# Patient Record
Sex: MALE | Race: WHITE | NOT HISPANIC OR LATINO | Employment: OTHER | URBAN - NONMETROPOLITAN AREA
[De-identification: names, ages, dates, MRNs, and addresses within clinical notes are randomized per-mention and may not be internally consistent; named-entity substitution may affect disease eponyms.]

---

## 2017-01-26 ENCOUNTER — OFFICE VISIT (OUTPATIENT)
Dept: CARDIOLOGY | Facility: CLINIC | Age: 82
End: 2017-01-26
Payer: MEDICARE

## 2017-01-26 VITALS
OXYGEN SATURATION: 96 % | HEIGHT: 69 IN | SYSTOLIC BLOOD PRESSURE: 112 MMHG | HEART RATE: 61 BPM | WEIGHT: 195 LBS | DIASTOLIC BLOOD PRESSURE: 60 MMHG | BODY MASS INDEX: 28.88 KG/M2

## 2017-01-26 DIAGNOSIS — G47.33 OSA (OBSTRUCTIVE SLEEP APNEA): ICD-10-CM

## 2017-01-26 DIAGNOSIS — Z95.5 STENTED CORONARY ARTERY: ICD-10-CM

## 2017-01-26 DIAGNOSIS — E78.2 MIXED DYSLIPIDEMIA: ICD-10-CM

## 2017-01-26 DIAGNOSIS — D64.89 ANEMIA DUE TO OTHER CAUSE: ICD-10-CM

## 2017-01-26 DIAGNOSIS — I62.9 INTRACRANIAL HEMORRHAGE (HCC): ICD-10-CM

## 2017-01-26 DIAGNOSIS — I25.10 CORONARY ARTERY DISEASE INVOLVING NATIVE CORONARY ARTERY OF NATIVE HEART WITHOUT ANGINA PECTORIS: ICD-10-CM

## 2017-01-26 DIAGNOSIS — I25.2 OLD MI (MYOCARDIAL INFARCTION): ICD-10-CM

## 2017-01-26 PROCEDURE — G8598 ASA/ANTIPLAT THER USED: HCPCS | Performed by: INTERNAL MEDICINE

## 2017-01-26 PROCEDURE — G8432 DEP SCR NOT DOC, RNG: HCPCS | Performed by: INTERNAL MEDICINE

## 2017-01-26 PROCEDURE — 1036F TOBACCO NON-USER: CPT | Performed by: INTERNAL MEDICINE

## 2017-01-26 PROCEDURE — 99214 OFFICE O/P EST MOD 30 MIN: CPT | Performed by: INTERNAL MEDICINE

## 2017-01-26 PROCEDURE — 4040F PNEUMOC VAC/ADMIN/RCVD: CPT | Performed by: INTERNAL MEDICINE

## 2017-01-26 PROCEDURE — 1101F PT FALLS ASSESS-DOCD LE1/YR: CPT | Mod: 8P | Performed by: INTERNAL MEDICINE

## 2017-01-26 PROCEDURE — G8420 CALC BMI NORM PARAMETERS: HCPCS | Performed by: INTERNAL MEDICINE

## 2017-01-26 PROCEDURE — G8482 FLU IMMUNIZE ORDER/ADMIN: HCPCS | Performed by: INTERNAL MEDICINE

## 2017-01-26 NOTE — MR AVS SNAPSHOT
"        Luis F Ruiz   2017 11:00 AM   Office Visit   MRN: [id number]    [facility]  [facility]   Dept Phone:  [phone]    Description:  Male : 1935   Provider:  Maru Sheriff MD,[facility]           Reason for Visit     Follow-Up           Allergies as of 2017     No Known Allergies      You were diagnosed with     Coronary artery disease involving native coronary artery of native heart without angina pectoris   [8451194]       Stented coronary artery   [787907]       Old MI (myocardial infarction)   [563507]       Intracranial hemorrhage (CMS-HCC)   [933337]       Mixed dyslipidemia   [596843]       ERUM (obstructive sleep apnea)   [723643]       Anemia due to other cause   [8892404]         Vital Signs     Blood Pressure Pulse Height Weight Body Mass Index Oxygen Saturation    112/60 mmHg 61 1.753 m (5' 9.02\") 88.451 kg (195 lb) 28.78 kg/m2 96%    Smoking Status                   Former Smoker           Basic Information     Date Of Birth Sex Race Ethnicity Preferred Language    1935 Male White Non- English      Your appointments     2017 11:00 AM   Annual Exam with Sasha Lopez D.O.   [facility] (--)    [address]  [facility] [address] [phone]            2017 10:00 AM   FOLLOW UP with [facility] Sheriff MD,[facility] ([facility])    [address] [address]  [address]  [facility] [address] [phone]              Problem List              ICD-10-CM Priority Class Noted - Resolved    Intracranial Hemorrhage-Right sided weakness w/ residual tingling right th,index,middle fingers I62.9   2009 - Present    ERUM (obstructive sleep apnea) G47.33   2009 - Present    Dysphagia    2009 - Present    Respiratory insufficiency R06.89   2009 - Present    Mixed dyslipidemia E78.2   7/10/2014 - Present    Acute blood loss anemia D62   3/7/2016 - Present "    Upper GI bleed K92.2   3/7/2016 - Present    Depression F32.9   3/23/2016 - Present    CVA (cerebral vascular accident) (CMS-HCC) I63.9   6/15/2016 - Present    Benign essential HTN I10   10/20/2016 - Present      Health Maintenance        Date Due Completion Dates    COLONOSCOPY 10/5/2020 10/5/2015 (Done), 11/14/2009 (Done)    Override on 10/5/2015: Done (polyps)    Override on 11/14/2009: Done    IMM DTaP/Tdap/Td Vaccine (2 - Td) 10/28/2025 10/28/2015            Current Immunizations     13-VALENT PCV PREVNAR 11/1/2014    Influenza TIV (IM) 10/14/2016, 11/1/2015, 11/14/2013, 10/17/2012, 10/1/2011    Influenza Vaccine 11/1/2009    Influenza Vaccine Adult HD 11/1/2015    Pneumococcal Vaccine (UF)Historical Data 10/1/2011    Pneumococcal polysaccharide vaccine (PPSV-23) 12/12/2009 12:53 PM    SHINGLES VACCINE 11/1/2014    Tdap Vaccine 10/28/2015    Tetanus Vaccine 11/1/2015, 4/20/2012    Varicella Vaccine Live 10/17/2012      Below and/or attached are the medications your provider expects you to take. Review all of your home medications and newly ordered medications with your provider and/or pharmacist. Follow medication instructions as directed by your provider and/or pharmacist. Please keep your medication list with you and share with your provider. Update the information when medications are discontinued, doses are changed, or new medications (including over-the-counter products) are added; and carry medication information at all times in the event of emergency situations     Allergies:  No Known Allergies          Medications  Valid as of: January 26, 2017 - 11:23 AM    Generic Name Brand Name Tablet Size Instructions for use    AmLODIPine Besylate (Tab) NORVASC 5 MG Take 1 tablet by mouth  twice a day        Atorvastatin Calcium (Tab) LIPITOR 40 MG Take 1 tablet by mouth  every day        Cholecalciferol (Tab) cholecalciferol 1000 UNIT Take 1,000 Units by mouth every day.        Clopidogrel Bisulfate (Tab)  PLAVIX 75 MG Take 1 tablet by mouth  every day        Losartan Potassium (Tab) COZAAR 25 MG Take 1 tablet by mouth  every day        Lutein (Tab) Lutein 20 MG Take  by mouth.        Metoprolol Tartrate (Tab) LOPRESSOR 25 MG Take 0.5 Tabs by mouth 2 times a day.        Nitroglycerin (SL Tab) NITROSTAT 0.4 MG Place 1 Tab under tongue as needed for Chest Pain. 1 tab every 5 minutes for CP, for no more than 3 doses. After three doses, call 911.        Pantoprazole Sodium (Tablet Delayed Response) PROTONIX 40 MG Take 1 Tab by mouth every day.        Sertraline HCl (Tab) ZOLOFT 25 MG Take 1 tablet by mouth  every day        Tadalafil (Tab) CIALIS 10 MG Take 1 Tab by mouth as needed for Erectile Dysfunction.        .                 Medicines prescribed today were sent to:     [facility] - [address] [facility] [[email or web address]] [id number]    Phone: [phone] Fax: [phone]    Open 24 Hours?: No    [id number]   [place], [place] - [id number] [facility]    [id number] [address]    Phone: [phone] Fax: [phone]    Open 24 Hours?: No      Medication refill instructions:       If your prescription bottle indicates you have medication refills left, it is not necessary to call your provider’s office. Please contact your pharmacy and they will refill your medication.    If your prescription bottle indicates you do not have any refills left, you may request refills at any time through one of the following ways: The online [employer] system (except Urgent Care), by calling your provider’s office, or by asking your pharmacy to contact your provider’s office with a refill request. Medication refills are processed only during regular business hours and may not be available until the next business day. Your provider may request additional information or to have a follow-up visit with you prior to refilling your medication.      *Please Note: Medication refills are assigned a new Rx number when refilled electronically. Your pharmacy may indicate that no refills were authorized even though a new prescription for the same medication is available at the pharmacy. Please request the medicine by name with the pharmacy before contacting your provider for a refill.        Your To Do List     Future Labs/Procedures Complete By Expires    COMP METABOLIC PANEL  As directed 1/26/2018    TSH  As directed 1/26/2018         MyChart Access Code: Activation code not generated  Current [employer] Status: Active

## 2017-01-26 NOTE — Clinical Note
[facility] [address],   [address]  [place] [address]  Phone: [phone]  Fax: [phone]              Luis F Ruiz  1935    Encounter Date: 1/26/2017    Sasha Lopez D.O.    Thank you for the referral. I had the pleasure of seeing Luis F Ruiz today in cardiology clinic. I've attached my visit note below. If you have any questions please feel free to give me a call anytime.      Maru Sheriff MD, PhD, Located within [facility]  Cardiology and Lipidology  [facility]                                                                  PROGRESS NOTE:  Chief Complaint   Patient presents with   • Follow-Up       This patient is an established male who is here today to discuss:  Recheck CAD, DAPT ; more energy and no visible bleed; No more CVA/ICH    Patient Active Problem List    Diagnosis Date Noted   • Benign essential HTN 10/20/2016   • CVA (cerebral vascular accident) (CMS-[facility]) 06/15/2016   • Depression 03/23/2016   • Acute blood loss anemia 03/07/2016   • Upper GI bleed 03/07/2016   • Mixed dyslipidemia 07/10/2014   • Respiratory insufficiency 12/14/2009   • Intracranial Hemorrhage-Right sided weakness w/ residual tingling right th,index,middle fingers 12/11/2009   • ERUM (obstructive sleep apnea) 12/11/2009   • Dysphagia 12/11/2009       Past Medical History   Diagnosis Date   • Hypertension    • Intracranial hemorrhage (CMS-[facility]) 12/11/2009   • GERD (gastroesophageal reflux disease)    • Hyperlipidemia    • ED (erectile dysfunction)    • CVA (cerebrovascular accident) (CMS-[facility]) 2010   • BPH (benign prostatic hypertrophy) 2010     stable   • Elevated PSA 2011     followed by Dr. Lu   • Basal ganglia disorder      right side weakness   • History of colonoscopy with polypectomy 10/5/15     polyps; nithin 2018   • Depression 3/23/2016     Past Surgical History   Procedure Laterality Date   • Eye surgery      cataracts   • Stent placement     • Gastroscopy-endo N/A 3/9/2016     Procedure: GASTROSCOPY-ENDO with Anesthesia ;  Surgeon: David Thompson M.D.;  Location: SURGERY [facility];  Service:        Current Outpatient Prescriptions   Medication Sig Dispense Refill   • clopidogrel (PLAVIX) 75 MG Tab Take 1 tablet by mouth  every day 90 Tab 3   • atorvastatin (LIPITOR) 40 MG Tab Take 1 tablet by mouth  every day 90 Tab 3   • amlodipine (NORVASC) 5 MG Tab Take 1 tablet by mouth  twice a day 180 Tab 3   • losartan (COZAAR) 25 MG Tab Take 1 tablet by mouth  every day 90 Tab 1   • sertraline (ZOLOFT) 25 MG tablet Take 1 tablet by mouth  every day 90 Tab 1   • vitamin D (CHOLECALCIFEROL) 1000 UNIT Tab Take 1,000 Units by mouth every day.     • metoprolol (LOPRESSOR) 25 MG Tab Take 0.5 Tabs by mouth 2 times a day. 90 Tab 3   • Lutein 20 MG Tab Take  by mouth.     • pantoprazole (PROTONIX) 40 MG Tablet Delayed Response Take 1 Tab by mouth every day. 30 Tab 11   • NITROSTAT 0.4 MG SL Tab Place 1 Tab under tongue as needed for Chest Pain. 1 tab every 5 minutes for CP, for no more than 3 doses. After three doses, call 911. 90 Tab 0   • tadalafil (CIALIS) 10 MG tablet Take 1 Tab by mouth as needed for Erectile Dysfunction. 40 Tab 0     No current facility-administered medications for this visit.     Review of patient's allergies indicates no known allergies.      Review of Systems:     Constitutional: Denies fevers, Denies weight changes  Eyes: Denies changes in vision, no eye pain  Ears/Nose/Throat/Mouth: Denies nasal congestion or sore throat   Cardiovascular: Denies chest pain or palpitations   Respiratory: Denies shortness of breath , Denies cough  Gastrointestinal/Hepatic: Denies abdominal pain, nausea, vomiting, diarrhea, constipation or GI bleeding   Genitourinary: Denies bladder dysfunction, dysuria or frequency  Musculoskeletal/Rheum: Denies  joint pain and swelling   Skin/Breast: Denies rash, denies breast lumps or  "discharge  Neurological: Denies headache, confusion, memory loss or focal weakness/parasthesias  Psychiatric: denies mood disorder   Endocrine: denies hx of diabetes or thyroid dysfunction  Heme/Oncology/Lymph Nodes: Denies enlarged lymph nodes, denies brusing or known bleeding disorder  Allergic/Immunologic: Denies hx of allergies      All other systems were reviewed and are negative (AMA/CMS criteria)      Blood pressure 112/60, pulse 61, height 1.753 m (5' 9.02\"), weight 88.451 kg (195 lb), SpO2 96 %.  General Appearance: Slow; Well developed, Well nourished, No acute distress, Non-toxic appearance.    HENT: Normocephalic, Atraumatic, Oropharynx moist mucous membranes, Dentition: Mallampati 4 OP, Nose normal.    Eyes: PERRLA, EOMI, Conjunctiva normal, No discharge.  Neck: Normal range of motion, No cervical tenderness, Supple, No stridor, no JVD . No thyromegaly. No carotid bruit.  Cardiovascular: Normal heart rate, Normal rhythm, nl S1, S2, no S3, S4; No gallops; No murmurs, No rubs, . Extremitites with intact distal pulses, no cyanosis, clubbing or edema. No heaves, thrills, HJR;  Peripheral pulses: carotid 2+, brachial 2+, radial 2+, ulnar 2+, femoral 2+, popliteal 1+, PT 2+, DP 2+;  Lungs: Respiratory effort is normal. Normal breath sounds, breath sounds clear to auscultation bilaterally, no rales, no rhonchi, no wheezing.    Abdomen: Bowel sounds normal, Soft, No tenderness, No guarding, No rebound, No masses, No hepatosplenomegaly.  Skin: Warm, Dry, No erythema, No rash, no induration or crepitus.  Neurologic: Slow with Rt drooping of corner of lip; with numbness;Alert & oriented x 3, Normal motor function, Normal sensory function, No focal deficits noted, cranial nerves II through XII are normal, normal gait.  Psychiatric: Affect normal, Judgment normal, Mood normal    Results for ELIZABETH FORREST (MRN [id number]) as of 1/26/2017 11:06   Ref. Range 4/14/2016 08:20 6/8/2016 09:32 9/6/2016 08:48 10/14/2016 08:40 " 1/25/2017 08:35   WBC Latest Ref Range: 4.8-10.8 K/uL 7.2 7.3 7.1     RBC Latest Ref Range: 4.70-6.10 M/uL 4.10 (L) 4.50 (L) 4.35 (L)     Hemoglobin Latest Ref Range: 14.0-18.0 g/dL 12.6 (L) 13.6 (L) 13.4 (L)     Hematocrit Latest Ref Range: 42.0-52.0 % 37.9 (L) 39.9 (L) 39.9 (L)     MCV Latest Ref Range: 80.0-94.0 fL 92.4 88.7 91.7     MCH Latest Ref Range: 27.0-31.0 pg 30.7 30.2 30.8     MCHC Latest Ref Range: 33.0-37.0 g/dL 33.2 34.1 33.6     RDW Latest Ref Range: 11.5-14.5 % 13.4 13.0 13.4     Platelet Count Latest Ref Range: 130-400 K/uL 243 209 217     MPV Latest Ref Range: 7.4-10.4 fL 11.1 (H) 10.5 (H) 10.7 (H)     Neutrophils Automated Latest Ref Range: 39.0-70.0 %   59.9     Abs Neutrophils Automated Latest Ref Range: 1.8-7.7 K/uL   4.3     Lymphocytes Automated Latest Ref Range: 21.0-50.0 %   25.6     Abs Lymph Automated Latest Ref Range: 1.2-4.8 K/uL   1.8     Eosinophils Automated Latest Ref Range: 0.0-5.0 %   6.2 (H)     Basophils Automated Latest Ref Range: 0.0-3.0 %   1.0     Monocytes Automated Latest Ref Range: 2.0-9.0 %   7.0     Eosinophil Count, Blood Latest Ref Range: 0.00-0.50 K/uL   0.44     Sodium Latest Ref Range: 136-145 mmol/L 144 141      Potassium Latest Ref Range: 3.5-5.1 mmol/L 3.9 4.1      Chloride Latest Ref Range:  mmol/L 107 107      Co2 Latest Ref Range: 21-32 mmol/L 27 27      Anion Gap Latest Ref Range: 10-18 mmol/L 14 11      Glucose Latest Ref Range: 74-99 mg/dL 102 (H) 100 (H)      Bun Latest Ref Range: 7-18 mg/dL 18 21 (H)      Creatinine Latest Ref Range: 0.8-1.3 mg/dL 1.0 1.0      GFR If  Latest Ref Range: >60 mL/min/1.73 m 2 >60 >60      GFR If Non  Latest Ref Range: >60 mL/min/1.73 m 2 >60 >60      Calcium Latest Ref Range: 8.5-11.0 mg/dL 8.8 8.8      AST(SGOT) Latest Ref Range: 15-37 U/L  19      ALT(SGPT) Latest Ref Range: 12-78 U/L  27      Alkaline Phosphatase Latest Ref Range:  U/L  78      Total Bilirubin Latest Ref  Range: 0.2-1.0 mg/dL  0.7      Albumin Latest Ref Range: 3.4-5.0 g/dL  3.7      Total Protein Latest Ref Range: 6.4-8.2 g/dL  7.5      A-G Ratio Unknown  1.0      Magnesium Latest Ref Range: 1.8-2.4 mg/dL    2.0    Cholesterol,Tot Latest Ref Range: 120-200 mg/dL  113 (L)   139   Triglycerides Latest Ref Range: 0-150 mg/dL  143   144   HDL Latest Ref Range: 40.0-60.0 mg/dL  42.0   53.0   Non HDL Cholesterol Latest Ref Range:    71   86   LDL Latest Ref Range: <100 mg/dL  42   57   Chol-Hdl Ratio Unknown  2.69   2.62   Vitamin B12 -True Cobalamin Latest Ref Range: 211-911 pg/mL    533    Folate -Folic Acid Latest Ref Range: >3.1 ng/mL    18.59    Prostatic Specific Antigen Tot Latest Ref Range: 0.00-4.00 ng/mL     4.39 (H)   Vitamin D 25OH Total Latest Ref Range:  ng/mL  36        Assessment and Plan.   81 y.o. male will use Plavix instead of ASA; No bleedings at this period; BB use discussed will use 12.5 mg bid; Not using Cialis; Less snoring and sleeps well; Rare stop breathing per wife;    1. Coronary artery disease involving native coronary artery of native heart without angina pectoris    - COMP METABOLIC PANEL; Future    2. Stented coronary artery  asx    3. Old MI (myocardial infarction)  asx    4. Intracranial Hemorrhage-Right sided weakness w/ residual tingling right th,index,middle fingers  No recurrence    5. Mixed dyslipidemia    - TSH; Future    6. ERUM (obstructive sleep apnea)  No txmt per patient    7. Anemia due to other cause    - CBC WITHOUT DIFFERENTIAL      1. Coronary artery disease involving native coronary artery of native heart without angina pectoris  COMP METABOLIC PANEL   2. Stented coronary artery     3. Old MI (myocardial infarction)     4. Intracranial Hemorrhage-Right sided weakness w/ residual tingling right th,index,middle fingers     5. Mixed dyslipidemia  TSH   6. ERUM (obstructive sleep apnea)     7. Anemia due to other cause  CBC WITHOUT DIFFERENTIAL             Sasha  SANDRA Lopez D.O.  [address] [facility] [address]  VIA In Basket

## 2017-01-26 NOTE — PROGRESS NOTES
Chief Complaint   Patient presents with   • Follow-Up       This patient is an established male who is here today to discuss:  Recheck CAD, DAPT ; more energy and no visible bleed; No more CVA/ICH    Patient Active Problem List    Diagnosis Date Noted   • Benign essential HTN 10/20/2016   • CVA (cerebral vascular accident) (CMS-[facility]) 06/15/2016   • Depression 03/23/2016   • Acute blood loss anemia 03/07/2016   • Upper GI bleed 03/07/2016   • Mixed dyslipidemia 07/10/2014   • Respiratory insufficiency 12/14/2009   • Intracranial Hemorrhage-Right sided weakness w/ residual tingling right th,index,middle fingers 12/11/2009   • ERUM (obstructive sleep apnea) 12/11/2009   • Dysphagia 12/11/2009       Past Medical History   Diagnosis Date   • Hypertension    • Intracranial hemorrhage (CMS-HCC) 12/11/2009   • GERD (gastroesophageal reflux disease)    • Hyperlipidemia    • ED (erectile dysfunction)    • CVA (cerebrovascular accident) (CMS-HCC) 2010   • BPH (benign prostatic hypertrophy) 2010     stable   • Elevated PSA 2011     followed by Dr. Lu   • Basal ganglia disorder      right side weakness   • History of colonoscopy with polypectomy 10/5/15     polyps; nithin 2018   • Depression 3/23/2016     Past Surgical History   Procedure Laterality Date   • Eye surgery       cataracts   • Stent placement     • Gastroscopy-endo N/A 3/9/2016     Procedure: GASTROSCOPY-ENDO with Anesthesia ;  Surgeon: David Thompson M.D.;  Location: SURGERY [facility];  Service:        Current Outpatient Prescriptions   Medication Sig Dispense Refill   • clopidogrel (PLAVIX) 75 MG Tab Take 1 tablet by mouth  every day 90 Tab 3   • atorvastatin (LIPITOR) 40 MG Tab Take 1 tablet by mouth  every day 90 Tab 3   • amlodipine (NORVASC) 5 MG Tab Take 1 tablet by mouth  twice a day 180 Tab 3   • losartan (COZAAR) 25 MG Tab Take 1 tablet by mouth  every day 90 Tab 1   • sertraline (ZOLOFT) 25 MG tablet Take 1 tablet by mouth  every day 90 Tab 1   •  "vitamin D (CHOLECALCIFEROL) 1000 UNIT Tab Take 1,000 Units by mouth every day.     • metoprolol (LOPRESSOR) 25 MG Tab Take 0.5 Tabs by mouth 2 times a day. 90 Tab 3   • Lutein 20 MG Tab Take  by mouth.     • pantoprazole (PROTONIX) 40 MG Tablet Delayed Response Take 1 Tab by mouth every day. 30 Tab 11   • NITROSTAT 0.4 MG SL Tab Place 1 Tab under tongue as needed for Chest Pain. 1 tab every 5 minutes for CP, for no more than 3 doses. After three doses, call 911. 90 Tab 0   • tadalafil (CIALIS) 10 MG tablet Take 1 Tab by mouth as needed for Erectile Dysfunction. 40 Tab 0     No current facility-administered medications for this visit.     Review of patient's allergies indicates no known allergies.      Review of Systems:     Constitutional: Denies fevers, Denies weight changes  Eyes: Denies changes in vision, no eye pain  Ears/Nose/Throat/Mouth: Denies nasal congestion or sore throat   Cardiovascular: Denies chest pain or palpitations   Respiratory: Denies shortness of breath , Denies cough  Gastrointestinal/Hepatic: Denies abdominal pain, nausea, vomiting, diarrhea, constipation or GI bleeding   Genitourinary: Denies bladder dysfunction, dysuria or frequency  Musculoskeletal/Rheum: Denies  joint pain and swelling   Skin/Breast: Denies rash, denies breast lumps or discharge  Neurological: Denies headache, confusion, memory loss or focal weakness/parasthesias  Psychiatric: denies mood disorder   Endocrine: denies hx of diabetes or thyroid dysfunction  Heme/Oncology/Lymph Nodes: Denies enlarged lymph nodes, denies brusing or known bleeding disorder  Allergic/Immunologic: Denies hx of allergies      All other systems were reviewed and are negative (AMA/CMS criteria)      Blood pressure 112/60, pulse 61, height 1.753 m (5' 9.02\"), weight 88.451 kg (195 lb), SpO2 96 %.  General Appearance: Slow; Well developed, Well nourished, No acute distress, Non-toxic appearance.    HENT: Normocephalic, Atraumatic, Oropharynx moist " mucous membranes, Dentition: Mallampati 4 OP, Nose normal.    Eyes: PERRLA, EOMI, Conjunctiva normal, No discharge.  Neck: Normal range of motion, No cervical tenderness, Supple, No stridor, no JVD . No thyromegaly. No carotid bruit.  Cardiovascular: Normal heart rate, Normal rhythm, nl S1, S2, no S3, S4; No gallops; No murmurs, No rubs, . Extremitites with intact distal pulses, no cyanosis, clubbing or edema. No heaves, thrills, HJR;  Peripheral pulses: carotid 2+, brachial 2+, radial 2+, ulnar 2+, femoral 2+, popliteal 1+, PT 2+, DP 2+;  Lungs: Respiratory effort is normal. Normal breath sounds, breath sounds clear to auscultation bilaterally, no rales, no rhonchi, no wheezing.    Abdomen: Bowel sounds normal, Soft, No tenderness, No guarding, No rebound, No masses, No hepatosplenomegaly.  Skin: Warm, Dry, No erythema, No rash, no induration or crepitus.  Neurologic: Slow with Rt drooping of corner of lip; with numbness;Alert & oriented x 3, Normal motor function, Normal sensory function, No focal deficits noted, cranial nerves II through XII are normal, normal gait.  Psychiatric: Affect normal, Judgment normal, Mood normal    Results for ELIZABETH FORREST (MRN [id number]) as of 1/26/2017 11:06   Ref. Range 4/14/2016 08:20 6/8/2016 09:32 9/6/2016 08:48 10/14/2016 08:40 1/25/2017 08:35   WBC Latest Ref Range: 4.8-10.8 K/uL 7.2 7.3 7.1     RBC Latest Ref Range: 4.70-6.10 M/uL 4.10 (L) 4.50 (L) 4.35 (L)     Hemoglobin Latest Ref Range: 14.0-18.0 g/dL 12.6 (L) 13.6 (L) 13.4 (L)     Hematocrit Latest Ref Range: 42.0-52.0 % 37.9 (L) 39.9 (L) 39.9 (L)     MCV Latest Ref Range: 80.0-94.0 fL 92.4 88.7 91.7     MCH Latest Ref Range: 27.0-31.0 pg 30.7 30.2 30.8     MCHC Latest Ref Range: 33.0-37.0 g/dL 33.2 34.1 33.6     RDW Latest Ref Range: 11.5-14.5 % 13.4 13.0 13.4     Platelet Count Latest Ref Range: 130-400 K/uL 243 209 217     MPV Latest Ref Range: 7.4-10.4 fL 11.1 (H) 10.5 (H) 10.7 (H)     Neutrophils Automated Latest Ref  Range: 39.0-70.0 %   59.9     Abs Neutrophils Automated Latest Ref Range: 1.8-7.7 K/uL   4.3     Lymphocytes Automated Latest Ref Range: 21.0-50.0 %   25.6     Abs Lymph Automated Latest Ref Range: 1.2-4.8 K/uL   1.8     Eosinophils Automated Latest Ref Range: 0.0-5.0 %   6.2 (H)     Basophils Automated Latest Ref Range: 0.0-3.0 %   1.0     Monocytes Automated Latest Ref Range: 2.0-9.0 %   7.0     Eosinophil Count, Blood Latest Ref Range: 0.00-0.50 K/uL   0.44     Sodium Latest Ref Range: 136-145 mmol/L 144 141      Potassium Latest Ref Range: 3.5-5.1 mmol/L 3.9 4.1      Chloride Latest Ref Range:  mmol/L 107 107      Co2 Latest Ref Range: 21-32 mmol/L 27 27      Anion Gap Latest Ref Range: 10-18 mmol/L 14 11      Glucose Latest Ref Range: 74-99 mg/dL 102 (H) 100 (H)      Bun Latest Ref Range: 7-18 mg/dL 18 21 (H)      Creatinine Latest Ref Range: 0.8-1.3 mg/dL 1.0 1.0      GFR If  Latest Ref Range: >60 mL/min/1.73 m 2 >60 >60      GFR If Non  Latest Ref Range: >60 mL/min/1.73 m 2 >60 >60      Calcium Latest Ref Range: 8.5-11.0 mg/dL 8.8 8.8      AST(SGOT) Latest Ref Range: 15-37 U/L  19      ALT(SGPT) Latest Ref Range: 12-78 U/L  27      Alkaline Phosphatase Latest Ref Range:  U/L  78      Total Bilirubin Latest Ref Range: 0.2-1.0 mg/dL  0.7      Albumin Latest Ref Range: 3.4-5.0 g/dL  3.7      Total Protein Latest Ref Range: 6.4-8.2 g/dL  7.5      A-G Ratio Unknown  1.0      Magnesium Latest Ref Range: 1.8-2.4 mg/dL    2.0    Cholesterol,Tot Latest Ref Range: 120-200 mg/dL  113 (L)   139   Triglycerides Latest Ref Range: 0-150 mg/dL  143   144   HDL Latest Ref Range: 40.0-60.0 mg/dL  42.0   53.0   Non HDL Cholesterol Latest Ref Range:    71   86   LDL Latest Ref Range: <100 mg/dL  42   57   Chol-Hdl Ratio Unknown  2.69   2.62   Vitamin B12 -True Cobalamin Latest Ref Range: 211-911 pg/mL    533    Folate -Folic Acid Latest Ref Range: >3.1 ng/mL    18.59    Prostatic  Specific Antigen Tot Latest Ref Range: 0.00-4.00 ng/mL     4.39 (H)   Vitamin D 25OH Total Latest Ref Range:  ng/mL  36        Assessment and Plan.   81 y.o. male will use Plavix instead of ASA; No bleedings at this period; BB use discussed will use 12.5 mg bid; Not using Cialis; Less snoring and sleeps well; Rare stop breathing per wife;    1. Coronary artery disease involving native coronary artery of native heart without angina pectoris    - COMP METABOLIC PANEL; Future    2. Stented coronary artery  asx    3. Old MI (myocardial infarction)  asx    4. Intracranial Hemorrhage-Right sided weakness w/ residual tingling right th,index,middle fingers  No recurrence    5. Mixed dyslipidemia    - TSH; Future    6. ERUM (obstructive sleep apnea)  No txmt per patient    7. Anemia due to other cause    - CBC WITHOUT DIFFERENTIAL      1. Coronary artery disease involving native coronary artery of native heart without angina pectoris  COMP METABOLIC PANEL   2. Stented coronary artery     3. Old MI (myocardial infarction)     4. Intracranial Hemorrhage-Right sided weakness w/ residual tingling right th,index,middle fingers     5. Mixed dyslipidemia  TSH   6. ERUM (obstructive sleep apnea)     7. Anemia due to other cause  CBC WITHOUT DIFFERENTIAL

## 2017-07-13 ENCOUNTER — OFFICE VISIT (OUTPATIENT)
Dept: CARDIOLOGY | Facility: CLINIC | Age: 82
End: 2017-07-13
Payer: MEDICARE

## 2017-07-13 VITALS
HEART RATE: 54 BPM | WEIGHT: 193 LBS | BODY MASS INDEX: 28.58 KG/M2 | HEIGHT: 69 IN | OXYGEN SATURATION: 95 % | DIASTOLIC BLOOD PRESSURE: 60 MMHG | SYSTOLIC BLOOD PRESSURE: 110 MMHG

## 2017-07-13 DIAGNOSIS — I62.9 INTRACRANIAL HEMORRHAGE (HCC): ICD-10-CM

## 2017-07-13 DIAGNOSIS — I25.10 CORONARY ARTERY DISEASE INVOLVING NATIVE CORONARY ARTERY OF NATIVE HEART WITHOUT ANGINA PECTORIS: ICD-10-CM

## 2017-07-13 DIAGNOSIS — E78.2 MIXED DYSLIPIDEMIA: ICD-10-CM

## 2017-07-13 DIAGNOSIS — I10 ESSENTIAL HYPERTENSION: ICD-10-CM

## 2017-07-13 DIAGNOSIS — Z95.5 STENTED CORONARY ARTERY: ICD-10-CM

## 2017-07-13 DIAGNOSIS — I25.2 OLD MI (MYOCARDIAL INFARCTION): ICD-10-CM

## 2017-07-13 DIAGNOSIS — G47.33 OSA (OBSTRUCTIVE SLEEP APNEA): ICD-10-CM

## 2017-07-13 PROCEDURE — 99214 OFFICE O/P EST MOD 30 MIN: CPT | Performed by: INTERNAL MEDICINE

## 2017-07-13 ASSESSMENT — PAIN SCALES - GENERAL: PAINLEVEL: NO PAIN

## 2017-07-13 NOTE — Clinical Note
[facility] [address],   [address]  [place] [address]  Phone: [phone]  Fax: [phone]              Luis F Ruiz  1935    Encounter Date: 7/13/2017    Sasha Lopez D.O.    Thank you for the referral. I had the pleasure of seeing Luis F Ruiz today in cardiology clinic. I've attached my visit note below. If you have any questions please feel free to give me a call anytime.      Maru Sheriff MD, PhD, [facility]  Cardiology and Lipidology  [facility]                                                                  PROGRESS NOTE:  Chief Complaint   Patient presents with   • Follow-Up     6 month f/u     CAD, ICH/CVA    This patient is an established male who is here today to discuss:  Recheck CAD, DAPT ; more energy and no visible bleed; No more CVA/ICH; Doing well and restoring classic autos        Patient Active Problem List    Diagnosis Date Noted   • Benign essential HTN 10/20/2016   • CVA (cerebral vascular accident) (CMS-[facility]) 06/15/2016   • Depression 03/23/2016   • Acute blood loss anemia 03/07/2016   • Upper GI bleed 03/07/2016   • Mixed dyslipidemia 07/10/2014   • Respiratory insufficiency 12/14/2009   • Intracranial Hemorrhage-Right sided weakness w/ residual tingling right th,index,middle fingers 12/11/2009   • ERUM (obstructive sleep apnea) 12/11/2009   • Dysphagia 12/11/2009       Past Medical History   Diagnosis Date   • Hypertension    • Intracranial hemorrhage (CMS-[facility]) 12/11/2009   • GERD (gastroesophageal reflux disease)    • Hyperlipidemia    • ED (erectile dysfunction)    • CVA (cerebrovascular accident) (CMS-[facility]) 2010   • BPH (benign prostatic hypertrophy) 2010     stable   • Elevated PSA 2011     followed by Dr. Lu   • Basal ganglia disorder      right side weakness   • History of colonoscopy with polypectomy 10/5/15     polyps; nithin 2018   • Depression 3/23/2016          Past Surgical History   Procedure Laterality Date   • Eye surgery       cataracts   • Stent placement N/A 12/28/17     [facility] in [place], [place]   • Gastroscopy-endo N/A 3/9/2016     Procedure: GASTROSCOPY-ENDO with Anesthesia ;  Surgeon: David Thompson M.D.;  Location: SURGERY [facility];  Service:      Social History     Social History   • Marital Status:      Spouse Name: N/A   • Number of Children: N/A   • Years of Education: N/A     Social History Main Topics   • Smoking status: Former Smoker     Quit date: 03/09/1961   • Smokeless tobacco: Never Used   • Alcohol Use: No   • Drug Use: No   • Sexual Activity:     Partners: Female      Comment: ; one son (61 yo in 8/'17) lives in [place], [place]     Other Topics Concern   • None     Social History Narrative     Family History   Problem Relation Age of Onset   • Heart Disease Father    • Heart Disease Sister    • Cancer Sister      stomach   • Cancer Brother      kidney   • Heart Disease Brother        Current Outpatient Prescriptions   Medication Sig Dispense Refill   • sertraline (ZOLOFT) 25 MG tablet Take 1 tablet by mouth  every day 90 Tab 3   • metoprolol (LOPRESSOR) 25 MG Tab Take 0.5 Tabs by mouth 2 times a day. 90 Tab 3   • losartan (COZAAR) 25 MG Tab Take 1 tablet by mouth  every day 90 Tab 3   • clopidogrel (PLAVIX) 75 MG Tab Take 1 tablet by mouth  every day 90 Tab 3   • atorvastatin (LIPITOR) 40 MG Tab Take 1 tablet by mouth  every day 90 Tab 3   • amlodipine (NORVASC) 5 MG Tab Take 1 tablet by mouth  twice a day 180 Tab 3   • Lutein 20 MG Tab Take  by mouth.     • NITROSTAT 0.4 MG SL Tab Place 1 Tab under tongue as needed for Chest Pain. 1 tab every 5 minutes for CP, for no more than 3 doses. After three doses, call 911. 45 Tab 0   • tadalafil (CIALIS) 10 MG tablet Take 1 Tab by mouth as needed for Erectile Dysfunction. 40 Tab 0   • vitamin D (CHOLECALCIFEROL) 1000 UNIT Tab Take 1,000 Units by mouth every  "day.     • pantoprazole (PROTONIX) 40 MG Tablet Delayed Response Take 1 Tab by mouth every day. 30 Tab 11     No current facility-administered medications for this visit.     Review of patient's allergies indicates no known allergies.    Review of Systems:     Constitutional: Denies fevers, Denies weight changes  Eyes: Denies changes in vision, no eye pain  Ears/Nose/Throat/Mouth: Denies nasal congestion or sore throat   Cardiovascular: Denies chest pain or palpitations   Respiratory: Denies shortness of breath , Denies cough  Gastrointestinal/Hepatic: Denies abdominal pain, nausea, vomiting, diarrhea, constipation or GI bleeding   Genitourinary: Denies bladder dysfunction, dysuria or frequency  Musculoskeletal/Rheum: Denies  joint pain and swelling   Skin/Breast: Denies rash, denies breast lumps or discharge  Neurological: Denies headache, confusion, memory loss or focal weakness/parasthesias  Psychiatric: denies mood disorder   Endocrine: denies hx of diabetes or thyroid dysfunction  Heme/Oncology/Lymph Nodes: Denies enlarged lymph nodes, denies brusing or known bleeding disorder  Allergic/Immunologic: Denies hx of allergies      All other systems were reviewed and are negative (AMA/CMS criteria)      Blood pressure 110/60, pulse 54, height 1.753 m (5' 9.02\"), weight 87.544 kg (193 lb), SpO2 95 %.  General Appearance: Slow; Well developed, Well nourished, No acute distress, Non-toxic appearance.    HENT: Normocephalic, Atraumatic, Oropharynx moist mucous membranes, Dentition: Mallampati 4 OP, Nose normal.    Eyes: PERRLA, EOMI, Conjunctiva normal, No discharge.  Neck: Normal range of motion, No cervical tenderness, Supple, No stridor, no JVD . No thyromegaly. No carotid bruit.  Cardiovascular: Normal heart rate, Normal rhythm, nl S1, S2, no S3, S4; No gallops; No murmurs, No rubs, . Extremitites with intact distal pulses, no cyanosis, clubbing or edema. No heaves, thrills, HJR;  Peripheral pulses: carotid 2+, " brachial 2+, radial 2+, ulnar 2+, femoral 2+, popliteal 1+, PT 2+, DP 2+;  Lungs: Respiratory effort is normal. Normal breath sounds, breath sounds clear to auscultation bilaterally, no rales, no rhonchi, no wheezing.    Abdomen: Bowel sounds normal, Soft, No tenderness, No guarding, No rebound, No masses, No hepatosplenomegaly.  Skin: Warm, Dry, No erythema, No rash, no induration or crepitus.  Neurologic: Slow with Rt drooping of corner of lip; with numbness;Alert & oriented x 3, Normal motor function, Normal sensory function, No focal deficits noted, cranial nerves II through XII are normal, normal gait.  Psychiatric: Affect normal, Judgment normal, Mood normal    Results for ELIZABETH FORREST (MRN [id number]) as of 7/13/2017 10:00   Ref. Range 9/6/2016 08:48 10/14/2016 08:40 1/25/2017 08:35 4/11/2017 08:27 7/6/2017 08:00   WBC Latest Ref Range: 4.8-10.8 K/uL 7.1   7.9 6.1   RBC Latest Ref Range: 4.70-6.10 M/uL 4.35 (L)   4.52 (L) 4.31 (L)   Hemoglobin Latest Ref Range: 14.0-18.0 g/dL 13.4 (L)   14.3 13.5 (L)   Hematocrit Latest Ref Range: 42.0-52.0 % 39.9 (L)   41.1 (L) 38.9 (L)   MCV Latest Ref Range: 80.0-94.0 fL 91.7   90.9 90.3   MCH Latest Ref Range: 27.0-31.0 pg 30.8   31.6 (H) 31.3 (H)   MCHC Latest Ref Range: 33.0-37.0 g/dL 33.6   34.8 34.7   RDW Latest Ref Range: 11.5-14.5 % 13.4   13.0 13.0   Platelet Count Latest Ref Range: 130-400 K/uL 217   236 206   MPV Latest Ref Range: 7.4-10.4 fL 10.7 (H)   10.5 (H) 10.9 (H)   Neutrophils Automated Latest Ref Range: 39.0-70.0 % 59.9       Abs Neutrophils Automated Latest Ref Range: 1.8-7.7 K/uL 4.3       Lymphocytes Automated Latest Ref Range: 21.0-50.0 % 25.6       Abs Lymph Automated Latest Ref Range: 1.2-4.8 K/uL 1.8       Eosinophils Automated Latest Ref Range: 0.0-5.0 % 6.2 (H)       Basophils Automated Latest Ref Range: 0.0-3.0 % 1.0       Monocytes Automated Latest Ref Range: 2.0-9.0 % 7.0       Eosinophil Count, Blood Latest Ref Range: 0.00-0.50 K/uL 0.44        Sodium Latest Ref Range: 136-145 mmol/L    144 143   Potassium Latest Ref Range: 3.5-5.1 mmol/L    4.8 3.9   Chloride Latest Ref Range:  mmol/L    107 108 (H)   Co2 Latest Ref Range: 21-32 mmol/L    28 25   Anion Gap Latest Ref Range: 10-18 mmol/L    14 14   Glucose Latest Ref Range: 74-99 mg/dL    101 (H) 95   Bun Latest Ref Range: 7-18 mg/dL    27 (H) 25 (H)   Creatinine Latest Ref Range: 0.8-1.3 mg/dL    1.0 0.9   GFR If  Latest Ref Range: >60 mL/min/1.73 m 2    >60 >60   GFR If Non  Latest Ref Range: >60 mL/min/1.73 m 2    >60 >60   Calcium Latest Ref Range: 8.5-11.0 mg/dL    9.3 8.7   AST(SGOT) Latest Ref Range: 15-37 U/L    23 15   ALT(SGPT) Latest Ref Range: 12-78 U/L    33 23   Alkaline Phosphatase Latest Ref Range:  U/L    79 66   Total Bilirubin Latest Ref Range: 0.2-1.0 mg/dL    0.9 0.5   Albumin Latest Ref Range: 3.4-5.0 g/dL    3.7 3.6   Total Protein Latest Ref Range: 6.4-8.2 g/dL    7.4 7.1   A-G Ratio Unknown    1.0 1.0   Magnesium Latest Ref Range: 1.8-2.4 mg/dL  2.0      Lipase Latest Ref Range:  U/L    156    Cholesterol,Tot Latest Ref Range: 120-200 mg/dL   139 125    Triglycerides Latest Ref Range: 0-150 mg/dL   144 136    HDL Latest Ref Range: 40.0-60.0 mg/dL   53.0 40.0    Non HDL Cholesterol Latest Ref Range:     86 85    LDL Latest Ref Range: <100 mg/dL   57 58    Chol-Hdl Ratio Unknown   2.62 3.13    Vitamin B12 -True Cobalamin Latest Ref Range: 211-911 pg/mL  533      Folate -Folic Acid Latest Ref Range: >3.1 ng/mL  18.59      Prostatic Specific Antigen Tot Latest Ref Range: 0.00-4.00 ng/mL   4.39 (H)     Vitamin D 25OH Total Latest Ref Range:  ng/mL    35 32   TSH Latest Ref Range: 0.36-3.74 uIU/mL    1.51 1.71     Assessment and Plan.   82 y.o. male has mild anemia; will recheck in the future; No active bleed;    1. Coronary artery disease involving native coronary artery of native heart without angina pectoris  asx    2.  Stented coronary artery  asx    3. Old MI (myocardial infarction)  reviewed    4. Intracranial Hemorrhage-Right sided weakness w/ residual tingling right th,index,middle fingers  None, asx    5. Mixed dyslipidemia  recheck    6. ERUM (obstructive sleep apnea)  Continue CPAP    7. Essential hypertension  controlled      Return to clinic in  6 , months    1. Coronary artery disease involving native coronary artery of native heart without angina pectoris     2. Stented coronary artery     3. Old MI (myocardial infarction)     4. Intracranial Hemorrhage-Right sided weakness w/ residual tingling right th,index,middle fingers     5. Mixed dyslipidemia     6. ERUM (obstructive sleep apnea)     7. Essential hypertension               Sasha Lopez D.O.  [address] [facility] [address]  VIA In Basket

## 2017-07-13 NOTE — MR AVS SNAPSHOT
"        Luis F Joseph   2017 10:00 AM   Office Visit   MRN: [id number]    Department:  [facility]   Dept Phone:  [phone]    Description:  Male : 1935   Provider:  Maru Sheriff MD,FAC           Reason for Visit     Follow-Up 6 month f/u      Allergies as of 2017     No Known Allergies      You were diagnosed with     Coronary artery disease involving native coronary artery of native heart without angina pectoris   [3745103]       Stented coronary artery   [117628]       Old MI (myocardial infarction)   [061387]       Intracranial hemorrhage (CMS-HCC)   [745365]       Mixed dyslipidemia   [874490]       ERUM (obstructive sleep apnea)   [060473]       Essential hypertension   [3834025]         Vital Signs     Blood Pressure Pulse Height Weight Body Mass Index Oxygen Saturation    110/60 mmHg 54 1.753 m (5' 9.02\") 87.544 kg (193 lb) 28.49 kg/m2 95%    Smoking Status                   Former Smoker           Basic Information     Date Of Birth Sex Race Ethnicity Preferred Language    1935 Male White Non- English      Your appointments     2018 11:20 AM   [facility] ([facility])    [address] [place] [address]  2nd Floor  [facility] [id number] [phone]              Problem List              ICD-10-CM Priority Class Noted - Resolved    Intracranial Hemorrhage-Right sided weakness w/ residual tingling right th,index,middle fingers I62.9   2009 - Present    ERUM (obstructive sleep apnea) G47.33   2009 - Present    Dysphagia    2009 - Present    Respiratory insufficiency R06.89   2009 - Present    Mixed dyslipidemia E78.2   7/10/2014 - Present    Acute blood loss anemia D62   3/7/2016 - Present    Upper GI bleed K92.2   3/7/2016 - Present    Depression F32.9   3/23/2016 - Present    CVA (cerebral vascular accident) (CMS-HCC) I63.9   6/15/2016 - Present    Benign " essential HTN I10   10/20/2016 - Present      Health Maintenance        Date Due Completion Dates    IMM INFLUENZA (1) 9/1/2017 10/14/2016, 11/1/2015, 11/1/2015, 11/14/2013, 10/17/2012, 10/1/2011    COLONOSCOPY 10/5/2020 10/5/2015 (Done), 11/14/2009 (Done)    Override on 10/5/2015: Done (polyps)    Override on 11/14/2009: Done    IMM DTaP/Tdap/Td Vaccine (2 - Td) 10/28/2025 10/28/2015            Current Immunizations     13-VALENT PCV PREVNAR 11/1/2014    Influenza TIV (IM) 10/14/2016, 11/1/2015, 11/14/2013, 10/17/2012, 10/1/2011    Influenza Vaccine 11/1/2009    Influenza Vaccine Adult HD 11/1/2015    Pneumococcal Vaccine (UF)Historical Data 10/1/2011    Pneumococcal polysaccharide vaccine (PPSV-23) 12/12/2009 12:53 PM    SHINGLES VACCINE 11/1/2014    Tdap Vaccine 10/28/2015    Tetanus Vaccine 11/1/2015, 4/20/2012    Varicella Vaccine Live 10/17/2012      Below and/or attached are the medications your provider expects you to take. Review all of your home medications and newly ordered medications with your provider and/or pharmacist. Follow medication instructions as directed by your provider and/or pharmacist. Please keep your medication list with you and share with your provider. Update the information when medications are discontinued, doses are changed, or new medications (including over-the-counter products) are added; and carry medication information at all times in the event of emergency situations     Allergies:  No Known Allergies          Medications  Valid as of: July 13, 2017 - 10:10 AM    Generic Name Brand Name Tablet Size Instructions for use    AmLODIPine Besylate (Tab) NORVASC 5 MG Take 1 tablet by mouth  twice a day        Atorvastatin Calcium (Tab) LIPITOR 40 MG Take 1 tablet by mouth  every day        Cholecalciferol (Tab) cholecalciferol 1000 UNIT Take 1,000 Units by mouth every day.        Clopidogrel Bisulfate (Tab) PLAVIX 75 MG Take 1 tablet by mouth  every day        Losartan Potassium (Tab)  COZAAR 25 MG Take 1 tablet by mouth  every day        Lutein (Tab) Lutein 20 MG Take  by mouth.        Metoprolol Tartrate (Tab) LOPRESSOR 25 MG Take 0.5 Tabs by mouth 2 times a day.        Nitroglycerin (SL Tab) NITROSTAT 0.4 MG Place 1 Tab under tongue as needed for Chest Pain. 1 tab every 5 minutes for CP, for no more than 3 doses. After three doses, call 911.        Pantoprazole Sodium (Tablet Delayed Response) PROTONIX 40 MG Take 1 Tab by mouth every day.        Sertraline HCl (Tab) ZOLOFT 25 MG Take 1 tablet by mouth  every day        Tadalafil (Tab) CIALIS 10 MG Take 1 Tab by mouth as needed for Erectile Dysfunction.        .                 Medicines prescribed today were sent to:     [facility] - [address] [facility] [address]    Phone: [phone] Fax: [phone]    Open 24 Hours?: No    [id number]    - [place], [place] - [id number] [place] [address]    [id number] [address]    Phone: [phone] Fax: [phone]    Open 24 Hours?: No      Medication refill instructions:       If your prescription bottle indicates you have medication refills left, it is not necessary to call your provider’s office. Please contact your pharmacy and they will refill your medication.    If your prescription bottle indicates you do not have any refills left, you may request refills at any time through one of the following ways: The online [employer] system (except Urgent Care), by calling your provider’s office, or by asking your pharmacy to contact your provider’s office with a refill request. Medication refills are processed only during regular business hours and may not be available until the next business day. Your provider may request additional information or to have a follow-up visit with you prior to refilling your medication.   *Please Note: Medication refills are assigned a new Rx number when refilled  electronically. Your pharmacy may indicate that no refills were authorized even though a new prescription for the same medication is available at the pharmacy. Please request the medicine by name with the pharmacy before contacting your provider for a refill.           [employer] Access Code: Activation code not generated  Current [employer] Status: Active

## 2017-07-13 NOTE — PROGRESS NOTES
Chief Complaint   Patient presents with   • Follow-Up     6 month f/u     CAD, ICH/CVA    This patient is an established male who is here today to discuss:  Recheck CAD, DAPT ; more energy and no visible bleed; No more CVA/ICH; Doing well and restoring classic autos        Patient Active Problem List    Diagnosis Date Noted   • Benign essential HTN 10/20/2016   • CVA (cerebral vascular accident) (CMS-[facility]) 06/15/2016   • Depression 03/23/2016   • Acute blood loss anemia 03/07/2016   • Upper GI bleed 03/07/2016   • Mixed dyslipidemia 07/10/2014   • Respiratory insufficiency 12/14/2009   • Intracranial Hemorrhage-Right sided weakness w/ residual tingling right th,index,middle fingers 12/11/2009   • ERUM (obstructive sleep apnea) 12/11/2009   • Dysphagia 12/11/2009       Past Medical History   Diagnosis Date   • Hypertension    • Intracranial hemorrhage (CMS-[facility]) 12/11/2009   • GERD (gastroesophageal reflux disease)    • Hyperlipidemia    • ED (erectile dysfunction)    • CVA (cerebrovascular accident) (CMS-HCC) 2010   • BPH (benign prostatic hypertrophy) 2010     stable   • Elevated PSA 2011     followed by Dr. Lu   • Basal ganglia disorder      right side weakness   • History of colonoscopy with polypectomy 10/5/15     polyps; nithin 2018   • Depression 3/23/2016     Past Surgical History   Procedure Laterality Date   • Eye surgery       cataracts   • Stent placement N/A 12/28/17     [facility] in [place], [place]   • Gastroscopy-endo N/A 3/9/2016     Procedure: GASTROSCOPY-ENDO with Anesthesia ;  Surgeon: David Thompson M.D.;  Location: SURGERY [facility];  Service:      Social History     Social History   • Marital Status:      Spouse Name: N/A   • Number of Children: N/A   • Years of Education: N/A     Social History Main Topics   • Smoking status: Former Smoker     Quit date: 03/09/1961   • Smokeless tobacco: Never Used   • Alcohol Use: No   • Drug Use: No   • Sexual Activity:      Partners: Female      Comment: ; one son (61 yo in 8/'17) lives in [place], [place]     Other Topics Concern   • None     Social History Narrative     Family History   Problem Relation Age of Onset   • Heart Disease Father    • Heart Disease Sister    • Cancer Sister      stomach   • Cancer Brother      kidney   • Heart Disease Brother        Current Outpatient Prescriptions   Medication Sig Dispense Refill   • sertraline (ZOLOFT) 25 MG tablet Take 1 tablet by mouth  every day 90 Tab 3   • metoprolol (LOPRESSOR) 25 MG Tab Take 0.5 Tabs by mouth 2 times a day. 90 Tab 3   • losartan (COZAAR) 25 MG Tab Take 1 tablet by mouth  every day 90 Tab 3   • clopidogrel (PLAVIX) 75 MG Tab Take 1 tablet by mouth  every day 90 Tab 3   • atorvastatin (LIPITOR) 40 MG Tab Take 1 tablet by mouth  every day 90 Tab 3   • amlodipine (NORVASC) 5 MG Tab Take 1 tablet by mouth  twice a day 180 Tab 3   • Lutein 20 MG Tab Take  by mouth.     • NITROSTAT 0.4 MG SL Tab Place 1 Tab under tongue as needed for Chest Pain. 1 tab every 5 minutes for CP, for no more than 3 doses. After three doses, call 911. 45 Tab 0   • tadalafil (CIALIS) 10 MG tablet Take 1 Tab by mouth as needed for Erectile Dysfunction. 40 Tab 0   • vitamin D (CHOLECALCIFEROL) 1000 UNIT Tab Take 1,000 Units by mouth every day.     • pantoprazole (PROTONIX) 40 MG Tablet Delayed Response Take 1 Tab by mouth every day. 30 Tab 11     No current facility-administered medications for this visit.     Review of patient's allergies indicates no known allergies.    Review of Systems:     Constitutional: Denies fevers, Denies weight changes  Eyes: Denies changes in vision, no eye pain  Ears/Nose/Throat/Mouth: Denies nasal congestion or sore throat   Cardiovascular: Denies chest pain or palpitations   Respiratory: Denies shortness of breath , Denies cough  Gastrointestinal/Hepatic: Denies abdominal pain, nausea, vomiting, diarrhea, constipation or GI bleeding   Genitourinary: Denies  "bladder dysfunction, dysuria or frequency  Musculoskeletal/Rheum: Denies  joint pain and swelling   Skin/Breast: Denies rash, denies breast lumps or discharge  Neurological: Denies headache, confusion, memory loss or focal weakness/parasthesias  Psychiatric: denies mood disorder   Endocrine: denies hx of diabetes or thyroid dysfunction  Heme/Oncology/Lymph Nodes: Denies enlarged lymph nodes, denies brusing or known bleeding disorder  Allergic/Immunologic: Denies hx of allergies      All other systems were reviewed and are negative (AMA/CMS criteria)      Blood pressure 110/60, pulse 54, height 1.753 m (5' 9.02\"), weight 87.544 kg (193 lb), SpO2 95 %.  General Appearance: Slow; Well developed, Well nourished, No acute distress, Non-toxic appearance.    HENT: Normocephalic, Atraumatic, Oropharynx moist mucous membranes, Dentition: Mallampati 4 OP, Nose normal.    Eyes: PERRLA, EOMI, Conjunctiva normal, No discharge.  Neck: Normal range of motion, No cervical tenderness, Supple, No stridor, no JVD . No thyromegaly. No carotid bruit.  Cardiovascular: Normal heart rate, Normal rhythm, nl S1, S2, no S3, S4; No gallops; No murmurs, No rubs, . Extremitites with intact distal pulses, no cyanosis, clubbing or edema. No heaves, thrills, HJR;  Peripheral pulses: carotid 2+, brachial 2+, radial 2+, ulnar 2+, femoral 2+, popliteal 1+, PT 2+, DP 2+;  Lungs: Respiratory effort is normal. Normal breath sounds, breath sounds clear to auscultation bilaterally, no rales, no rhonchi, no wheezing.    Abdomen: Bowel sounds normal, Soft, No tenderness, No guarding, No rebound, No masses, No hepatosplenomegaly.  Skin: Warm, Dry, No erythema, No rash, no induration or crepitus.  Neurologic: Slow with Rt drooping of corner of lip; with numbness;Alert & oriented x 3, Normal motor function, Normal sensory function, No focal deficits noted, cranial nerves II through XII are normal, normal gait.  Psychiatric: Affect normal, Judgment normal, Mood " normal    Results for ELIZABETH FORREST (MRN [id number]) as of 7/13/2017 10:00   Ref. Range 9/6/2016 08:48 10/14/2016 08:40 1/25/2017 08:35 4/11/2017 08:27 7/6/2017 08:00   WBC Latest Ref Range: 4.8-10.8 K/uL 7.1   7.9 6.1   RBC Latest Ref Range: 4.70-6.10 M/uL 4.35 (L)   4.52 (L) 4.31 (L)   Hemoglobin Latest Ref Range: 14.0-18.0 g/dL 13.4 (L)   14.3 13.5 (L)   Hematocrit Latest Ref Range: 42.0-52.0 % 39.9 (L)   41.1 (L) 38.9 (L)   MCV Latest Ref Range: 80.0-94.0 fL 91.7   90.9 90.3   MCH Latest Ref Range: 27.0-31.0 pg 30.8   31.6 (H) 31.3 (H)   MCHC Latest Ref Range: 33.0-37.0 g/dL 33.6   34.8 34.7   RDW Latest Ref Range: 11.5-14.5 % 13.4   13.0 13.0   Platelet Count Latest Ref Range: 130-400 K/uL 217   236 206   MPV Latest Ref Range: 7.4-10.4 fL 10.7 (H)   10.5 (H) 10.9 (H)   Neutrophils Automated Latest Ref Range: 39.0-70.0 % 59.9       Abs Neutrophils Automated Latest Ref Range: 1.8-7.7 K/uL 4.3       Lymphocytes Automated Latest Ref Range: 21.0-50.0 % 25.6       Abs Lymph Automated Latest Ref Range: 1.2-4.8 K/uL 1.8       Eosinophils Automated Latest Ref Range: 0.0-5.0 % 6.2 (H)       Basophils Automated Latest Ref Range: 0.0-3.0 % 1.0       Monocytes Automated Latest Ref Range: 2.0-9.0 % 7.0       Eosinophil Count, Blood Latest Ref Range: 0.00-0.50 K/uL 0.44       Sodium Latest Ref Range: 136-145 mmol/L    144 143   Potassium Latest Ref Range: 3.5-5.1 mmol/L    4.8 3.9   Chloride Latest Ref Range:  mmol/L    107 108 (H)   Co2 Latest Ref Range: 21-32 mmol/L    28 25   Anion Gap Latest Ref Range: 10-18 mmol/L    14 14   Glucose Latest Ref Range: 74-99 mg/dL    101 (H) 95   Bun Latest Ref Range: 7-18 mg/dL    27 (H) 25 (H)   Creatinine Latest Ref Range: 0.8-1.3 mg/dL    1.0 0.9   GFR If  Latest Ref Range: >60 mL/min/1.73 m 2    >60 >60   GFR If Non  Latest Ref Range: >60 mL/min/1.73 m 2    >60 >60   Calcium Latest Ref Range: 8.5-11.0 mg/dL    9.3 8.7   AST(SGOT) Latest Ref Range:  15-37 U/L    23 15   ALT(SGPT) Latest Ref Range: 12-78 U/L    33 23   Alkaline Phosphatase Latest Ref Range:  U/L    79 66   Total Bilirubin Latest Ref Range: 0.2-1.0 mg/dL    0.9 0.5   Albumin Latest Ref Range: 3.4-5.0 g/dL    3.7 3.6   Total Protein Latest Ref Range: 6.4-8.2 g/dL    7.4 7.1   A-G Ratio Unknown    1.0 1.0   Magnesium Latest Ref Range: 1.8-2.4 mg/dL  2.0      Lipase Latest Ref Range:  U/L    156    Cholesterol,Tot Latest Ref Range: 120-200 mg/dL   139 125    Triglycerides Latest Ref Range: 0-150 mg/dL   144 136    HDL Latest Ref Range: 40.0-60.0 mg/dL   53.0 40.0    Non HDL Cholesterol Latest Ref Range:     86 85    LDL Latest Ref Range: <100 mg/dL   57 58    Chol-Hdl Ratio Unknown   2.62 3.13    Vitamin B12 -True Cobalamin Latest Ref Range: 211-911 pg/mL  533      Folate -Folic Acid Latest Ref Range: >3.1 ng/mL  18.59      Prostatic Specific Antigen Tot Latest Ref Range: 0.00-4.00 ng/mL   4.39 (H)     Vitamin D 25OH Total Latest Ref Range:  ng/mL    35 32   TSH Latest Ref Range: 0.36-3.74 uIU/mL    1.51 1.71     Assessment and Plan.   82 y.o. male has mild anemia; will recheck in the future; No active bleed;    1. Coronary artery disease involving native coronary artery of native heart without angina pectoris  asx    2. Stented coronary artery  asx    3. Old MI (myocardial infarction)  reviewed    4. Intracranial Hemorrhage-Right sided weakness w/ residual tingling right th,index,middle fingers  None, asx    5. Mixed dyslipidemia  recheck    6. ERUM (obstructive sleep apnea)  Continue CPAP    7. Essential hypertension  controlled      Return to clinic in  6 , months    1. Coronary artery disease involving native coronary artery of native heart without angina pectoris     2. Stented coronary artery     3. Old MI (myocardial infarction)     4. Intracranial Hemorrhage-Right sided weakness w/ residual tingling right th,index,middle fingers     5. Mixed dyslipidemia     6. ERUM  (obstructive sleep apnea)     7. Essential hypertension

## 2018-01-05 ENCOUNTER — OFFICE VISIT (OUTPATIENT)
Dept: CARDIOLOGY | Facility: CLINIC | Age: 83
End: 2018-01-05
Payer: MEDICARE

## 2018-01-05 VITALS
OXYGEN SATURATION: 95 % | BODY MASS INDEX: 29.47 KG/M2 | DIASTOLIC BLOOD PRESSURE: 56 MMHG | SYSTOLIC BLOOD PRESSURE: 114 MMHG | WEIGHT: 199 LBS | HEART RATE: 62 BPM | HEIGHT: 69 IN

## 2018-01-05 DIAGNOSIS — I25.2 OLD MI (MYOCARDIAL INFARCTION): ICD-10-CM

## 2018-01-05 DIAGNOSIS — I10 ESSENTIAL HYPERTENSION: ICD-10-CM

## 2018-01-05 DIAGNOSIS — G47.33 OSA (OBSTRUCTIVE SLEEP APNEA): ICD-10-CM

## 2018-01-05 DIAGNOSIS — R42 DIZZINESS: ICD-10-CM

## 2018-01-05 DIAGNOSIS — I62.9 INTRACRANIAL HEMORRHAGE (HCC): ICD-10-CM

## 2018-01-05 DIAGNOSIS — E78.2 MIXED DYSLIPIDEMIA: ICD-10-CM

## 2018-01-05 DIAGNOSIS — Z95.5 STENTED CORONARY ARTERY: ICD-10-CM

## 2018-01-05 DIAGNOSIS — I25.10 CORONARY ARTERY DISEASE INVOLVING NATIVE CORONARY ARTERY OF NATIVE HEART WITHOUT ANGINA PECTORIS: ICD-10-CM

## 2018-01-05 PROCEDURE — 99214 OFFICE O/P EST MOD 30 MIN: CPT | Performed by: INTERNAL MEDICINE

## 2018-01-05 RX ORDER — CLOPIDOGREL BISULFATE 75 MG/1
75 TABLET ORAL DAILY
Qty: 90 TAB | Refills: 3 | Status: SHIPPED | OUTPATIENT
Start: 2018-01-05 | End: 2018-04-19 | Stop reason: SDUPTHER

## 2018-01-05 NOTE — PROGRESS NOTES
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, hemorrhagic type    Patient Active Problem List    Diagnosis Date Noted   • Benign essential HTN 10/20/2016   • CVA (cerebral vascular accident) (CMS-HCC) 06/15/2016   • Depression 03/23/2016   • Acute blood loss anemia 03/07/2016   • Upper GI bleed 03/07/2016   • Mixed dyslipidemia 07/10/2014   • Respiratory insufficiency 12/14/2009   • Intracranial Hemorrhage-Right sided weakness w/ residual tingling right th,index,middle fingers 12/11/2009   • ERUM (obstructive sleep apnea) 12/11/2009   • Dysphagia 12/11/2009       Past Medical History:   Diagnosis Date   • Basal ganglia disorder     right side weakness   • BPH (benign prostatic hypertrophy) 2010    stable   • CVA (cerebrovascular accident) (CMS-HCC) 2010   • Depression 3/23/2016   • ED (erectile dysfunction)    • Elevated PSA 2011    followed by Dr. Lu   • GERD (gastroesophageal reflux disease)    • History of colonoscopy with polypectomy 10/5/15    polyps; nithin 2018   • Hyperlipidemia    • Hypertension    • Intracranial hemorrhage (CMS-HCC) 12/11/2009     Past Surgical History:   Procedure Laterality Date   • GASTROSCOPY-ENDO N/A 3/9/2016    Procedure: GASTROSCOPY-ENDO with Anesthesia ;  Surgeon: David Thompson M.D.;  Location: SURGERY [facility] ([place], [place]);  Service:    • EYE SURGERY      cataracts   • STENT PLACEMENT N/A 12/28/17    [facility] in [place], [place]     Social History     Social History   • Marital status:      Spouse name: N/A   • Number of children: N/A   • Years of education: N/A     Social History Main Topics   • Smoking status: Former Smoker     Quit date: 3/9/1961   • Smokeless tobacco: Never Used   • Alcohol use No   • Drug use: No   • Sexual activity: Yes     Partners: Female      Comment: ; one son (59 yo  in 8/'17) lives in [place], [place]     Other Topics Concern   • Not on file     Social History Narrative   • No narrative on file     Family History   Problem Relation Age of Onset   • Heart Disease Father    • Heart Disease Sister    • Cancer Sister      stomach   • Cancer Brother      kidney   • Heart Disease Brother        Current Outpatient Prescriptions   Medication Sig Dispense Refill   • clopidogrel (PLAVIX) 75 MG Tab TAKE 1 TABLET BY MOUTH  EVERY DAY 90 Tab 3   • atorvastatin (LIPITOR) 40 MG Tab TAKE 1 TABLET BY MOUTH  EVERY DAY 90 Tab 3   • amlodipine (NORVASC) 5 MG Tab TAKE 1 TABLET BY MOUTH  TWICE A  Tab 1   • CIALIS 10 MG tablet Take 1 tablet by mouth as  needed for Erectile  Dysfunction 18 Tab 0   • sertraline (ZOLOFT) 25 MG tablet Take 1 tablet by mouth  every day 90 Tab 3   • metoprolol (LOPRESSOR) 25 MG Tab Take 0.5 Tabs by mouth 2 times a day. 90 Tab 3   • losartan (COZAAR) 25 MG Tab Take 1 tablet by mouth  every day 90 Tab 3   • vitamin D (CHOLECALCIFEROL) 1000 UNIT Tab Take 1,000 Units by mouth every day.     • Lutein 20 MG Tab Take  by mouth.     • pantoprazole (PROTONIX) 40 MG Tablet Delayed Response Take 1 Tab by mouth every day. 30 Tab 11   • NITROSTAT 0.4 MG SL Tab Place 1 Tab under tongue as needed for Chest Pain. 1 tab every 5 minutes for CP, for no more than 3 doses. After three doses, call 911. 45 Tab 0     No current facility-administered medications for this visit.      Patient has no known allergies.    Review of Systems:     Constitutional: Denies fevers, Denies weight changes  Eyes: Denies changes in vision, no eye pain  Ears/Nose/Throat/Mouth: Denies nasal congestion or sore throat   Cardiovascular: Denies chest pain or palpitations   Respiratory: Denies shortness of breath , Denies cough  Gastrointestinal/Hepatic: Denies abdominal pain, nausea, vomiting, diarrhea, constipation or GI bleeding   Genitourinary: Denies bladder dysfunction, dysuria or  "frequency  Musculoskeletal/Rheum: Denies  joint pain and swelling   Skin/Breast: Denies rash, denies breast lumps or discharge  Neurological: Denies headache, confusion, memory loss or focal weakness/parasthesias  Psychiatric: denies mood disorder   Endocrine: denies hx of diabetes or thyroid dysfunction  Heme/Oncology/Lymph Nodes: Denies enlarged lymph nodes, denies brusing or known bleeding disorder  Allergic/Immunologic: Denies hx of allergies      All other systems were reviewed and are negative (AMA/CMS criteria)      Blood pressure 114/56, pulse 62, height 1.753 m (5' 9\"), weight 90.3 kg (199 lb), SpO2 95 %.  General Appearance: Slow; Well developed, Well nourished, No acute distress, Non-toxic appearance.    HENT: Normocephalic, Atraumatic, Oropharynx moist mucous membranes, Dentition: Mallampati 4 OP, Nose normal.    Eyes: PERRLA, EOMI, Conjunctiva normal, No discharge.  Neck: Normal range of motion, No cervical tenderness, Supple, No stridor, no JVD . No thyromegaly. No carotid bruit.  Cardiovascular: Normal heart rate, Normal rhythm, nl S1, S2, no S3, S4; No gallops; No murmurs, No rubs, . Extremitites with intact distal pulses, no cyanosis, clubbing or edema. No heaves, thrills, HJR;  Peripheral pulses: carotid 2+, brachial 2+, radial 2+, ulnar 2+, femoral 2+, popliteal 1+, PT 2+, DP 2+;  Lungs: Respiratory effort is normal. Normal breath sounds, breath sounds clear to auscultation bilaterally, no rales, no rhonchi, no wheezing.    Abdomen: Bowel sounds normal, Soft, No tenderness, No guarding, No rebound, No masses, No hepatosplenomegaly.  Skin: Warm, Dry, No erythema, No rash, no induration or crepitus.  Neurologic: Slow with Rt drooping of corner of lip; with numbness;Alert & oriented x 3, Normal motor function, Normal sensory function, No focal deficits noted, cranial nerves II through XII are normal, normal gait.  Psychiatric: Affect normal, Judgment normal, Mood normal    Results for ELIZABETH FORREST " (MRN [id number]) as of 1/5/2018 11:28   Ref. Range 7/6/2017 08:00 12/27/2017 12:20   WBC Latest Ref Range: 4.8 - 10.8 K/uL 6.1 7.9   RBC Latest Ref Range: 4.70 - 6.10 M/uL 4.31 (L) 4.49 (L)   Hemoglobin Latest Ref Range: 14.0 - 18.0 g/dL 13.5 (L) 14.2   Hematocrit Latest Ref Range: 42.0 - 52.0 % 38.9 (L) 40.5 (L)   MCV Latest Ref Range: 80.0 - 94.0 fL 90.3 90.2   MCH Latest Ref Range: 27.0 - 31.0 pg 31.3 (H) 31.6 (H)   MCHC Latest Ref Range: 33.0 - 37.0 g/dL 34.7 35.1   RDW Latest Ref Range: 11.5 - 14.5 % 13.0 13.3   Platelet Count Latest Ref Range: 130 - 400 K/uL 206 255   MPV Latest Ref Range: 7.4 - 10.4 fL 10.9 (H) 10.6 (H)   Results for ELIZABETH FORREST (MRN [id number]) as of 1/5/2018 11:28   Ref. Range 1/25/2017 08:35 4/11/2017 08:27 7/6/2017 08:00   Cholesterol,Tot Latest Ref Range: 120 - 200 mg/dL 139 125    Triglycerides Latest Ref Range: 0 - 150 mg/dL 144 136    HDL Latest Ref Range: 40.0 - 60.0 mg/dL 53.0 40.0    Non HDL Cholesterol Latest Ref Range: 30 - 160  86 85    LDL Latest Ref Range: <100 mg/dL 57 58    Chol-Hdl Ratio Unknown 2.62 3.13    Prostatic Specific Antigen Tot Latest Ref Range: 0.00 - 4.00 ng/mL 4.39 (H)     Vitamin D 25OH Total Latest Ref Range: 30 - 100 ng/mL  35 32   TSH Latest Ref Range: 0.36 - 3.74 uIU/mL  1.51 1.71     Assessment and Plan.   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 Coronary artery disease involving native coronary artery of native heart without angina pectoris  asx    2. Stented coronary artery  Self disolving    3. Old MI (myocardial infarction)  asx and stable    4. Intracranial Hemorrhage-Right sided weakness w/ residual tingling right th,index,middle fingers  discussed    5. Mixed dyslipidemia  Recheck    6. ERUM (obstructive sleep apnea): prob not on 1/2016 OPO  No treatment; refused sleep study    7. Essential  hypertension  controlled      Return to clinic in  3 , months    1. Coronary artery disease involving native coronary artery of native heart without angina pectoris     2. Stented coronary artery     3. Old MI (myocardial infarction)     4. Intracranial Hemorrhage-Right sided weakness w/ residual tingling right th,index,middle fingers     5. Mixed dyslipidemia     6. ERUM (obstructive sleep apnea)     7. Essential hypertension          unsure

## 2018-01-05 NOTE — LETTER
[facility] [address],   [address]  [place] [address]  Phone: [phone]  Fax: [phone]              Luis F Riuz  1935    Encounter Date: 1/5/2018    Sasha Lopez D.O.    Thank you for the referral. I had the pleasure of seeing Luis F Ruiz today in cardiology clinic. I've attached my visit note below. If you have any questions please feel free to give me a call anytime.      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, hemorrhagic type    Patient Active Problem List    Diagnosis Date Noted   • Benign essential HTN 10/20/2016   • CVA (cerebral vascular accident) (CMS-[facility]) 06/15/2016   • Depression 03/23/2016   • Acute blood loss anemia 03/07/2016   • Upper GI bleed 03/07/2016   • Mixed dyslipidemia 07/10/2014   • Respiratory insufficiency 12/14/2009   • Intracranial Hemorrhage-Right sided weakness w/ residual tingling right th,index,middle fingers 12/11/2009   • ERUM (obstructive sleep apnea) 12/11/2009   • Dysphagia 12/11/2009       Past Medical History:   Diagnosis Date   • Basal ganglia disorder     right side weakness   • BPH (benign prostatic hypertrophy) 2010    stable   • CVA (cerebrovascular accident) (CMS-HCC) 2010   • Depression 3/23/2016   • ED (erectile dysfunction)    • Elevated PSA 2011    followed by Dr. Lu   • GERD (gastroesophageal reflux disease)    • History of colonoscopy with polypectomy 10/5/15    polyps; nithin 2018   • Hyperlipidemia    • Hypertension    • Intracranial hemorrhage (CMS-[facility]) 12/11/2009     Past Surgical History:   Procedure Laterality Date   •  GASTROSCOPY-ENDO N/A 3/9/2016    Procedure: GASTROSCOPY-ENDO with Anesthesia ;  Surgeon: David Thompson M.D.;  Location: SURGERY [facility];  Service:    • EYE SURGERY      cataracts   • STENT PLACEMENT N/A 12/28/17    [facility] in [place], [place]     Social History     Social History   • Marital status:      Spouse name: N/A   • Number of children: N/A   • Years of education: N/A     Social History Main Topics   • Smoking status: Former Smoker     Quit date: 3/9/1961   • Smokeless tobacco: Never Used   • Alcohol use No   • Drug use: No   • Sexual activity: Yes     Partners: Female      Comment: ; one son (59 yo in 8/'17) lives in [place], [place]     Other Topics Concern   • Not on file     Social History Narrative   • No narrative on file     Family History   Problem Relation Age of Onset   • Heart Disease Father    • Heart Disease Sister    • Cancer Sister      stomach   • Cancer Brother      kidney   • Heart Disease Brother        Current Outpatient Prescriptions   Medication Sig Dispense Refill   • clopidogrel (PLAVIX) 75 MG Tab TAKE 1 TABLET BY MOUTH  EVERY DAY 90 Tab 3   • atorvastatin (LIPITOR) 40 MG Tab TAKE 1 TABLET BY MOUTH  EVERY DAY 90 Tab 3   • amlodipine (NORVASC) 5 MG Tab TAKE 1 TABLET BY MOUTH  TWICE A  Tab 1   • CIALIS 10 MG tablet Take 1 tablet by mouth as  needed for Erectile  Dysfunction 18 Tab 0   • sertraline (ZOLOFT) 25 MG tablet Take 1 tablet by mouth  every day 90 Tab 3   • metoprolol (LOPRESSOR) 25 MG Tab Take 0.5 Tabs by mouth 2 times a day. 90 Tab 3   • losartan (COZAAR) 25 MG Tab Take 1 tablet by mouth  every day 90 Tab 3   • vitamin D (CHOLECALCIFEROL) 1000 UNIT Tab Take 1,000 Units by mouth every day.     • Lutein 20 MG Tab Take  by mouth.     • pantoprazole (PROTONIX) 40 MG Tablet Delayed Response Take 1 Tab by mouth every day. 30 Tab 11   • NITROSTAT 0.4 MG SL Tab Place 1 Tab under tongue as needed for Chest Pain. 1 tab every 5 minutes  "for CP, for no more than 3 doses. After three doses, call 911. 45 Tab 0     No current facility-administered medications for this visit.      Patient has no known allergies.    Review of Systems:     Constitutional: Denies fevers, Denies weight changes  Eyes: Denies changes in vision, no eye pain  Ears/Nose/Throat/Mouth: Denies nasal congestion or sore throat   Cardiovascular: Denies chest pain or palpitations   Respiratory: Denies shortness of breath , Denies cough  Gastrointestinal/Hepatic: Denies abdominal pain, nausea, vomiting, diarrhea, constipation or GI bleeding   Genitourinary: Denies bladder dysfunction, dysuria or frequency  Musculoskeletal/Rheum: Denies  joint pain and swelling   Skin/Breast: Denies rash, denies breast lumps or discharge  Neurological: Denies headache, confusion, memory loss or focal weakness/parasthesias  Psychiatric: denies mood disorder   Endocrine: denies hx of diabetes or thyroid dysfunction  Heme/Oncology/Lymph Nodes: Denies enlarged lymph nodes, denies brusing or known bleeding disorder  Allergic/Immunologic: Denies hx of allergies      All other systems were reviewed and are negative (AMA/CMS criteria)      Blood pressure 114/56, pulse 62, height 1.753 m (5' 9\"), weight 90.3 kg (199 lb), SpO2 95 %.  General Appearance: Slow; Well developed, Well nourished, No acute distress, Non-toxic appearance.    HENT: Normocephalic, Atraumatic, Oropharynx moist mucous membranes, Dentition: Mallampati 4 OP, Nose normal.    Eyes: PERRLA, EOMI, Conjunctiva normal, No discharge.  Neck: Normal range of motion, No cervical tenderness, Supple, No stridor, no JVD . No thyromegaly. No carotid bruit.  Cardiovascular: Normal heart rate, Normal rhythm, nl S1, S2, no S3, S4; No gallops; No murmurs, No rubs, . Extremitites with intact distal pulses, no cyanosis, clubbing or edema. No heaves, thrills, HJR;  Peripheral pulses: carotid 2+, brachial 2+, radial 2+, ulnar 2+, femoral 2+, popliteal 1+, PT 2+, DP " 2+;  Lungs: Respiratory effort is normal. Normal breath sounds, breath sounds clear to auscultation bilaterally, no rales, no rhonchi, no wheezing.    Abdomen: Bowel sounds normal, Soft, No tenderness, No guarding, No rebound, No masses, No hepatosplenomegaly.  Skin: Warm, Dry, No erythema, No rash, no induration or crepitus.  Neurologic: Slow with Rt drooping of corner of lip; with numbness;Alert & oriented x 3, Normal motor function, Normal sensory function, No focal deficits noted, cranial nerves II through XII are normal, normal gait.  Psychiatric: Affect normal, Judgment normal, Mood normal    Results for ELIZABETH FORREST (MRN [id number]) as of 1/5/2018 11:28   Ref. Range 7/6/2017 08:00 12/27/2017 12:20   WBC Latest Ref Range: 4.8 - 10.8 K/uL 6.1 7.9   RBC Latest Ref Range: 4.70 - 6.10 M/uL 4.31 (L) 4.49 (L)   Hemoglobin Latest Ref Range: 14.0 - 18.0 g/dL 13.5 (L) 14.2   Hematocrit Latest Ref Range: 42.0 - 52.0 % 38.9 (L) 40.5 (L)   MCV Latest Ref Range: 80.0 - 94.0 fL 90.3 90.2   MCH Latest Ref Range: 27.0 - 31.0 pg 31.3 (H) 31.6 (H)   MCHC Latest Ref Range: 33.0 - 37.0 g/dL 34.7 35.1   RDW Latest Ref Range: 11.5 - 14.5 % 13.0 13.3   Platelet Count Latest Ref Range: 130 - 400 K/uL 206 255   MPV Latest Ref Range: 7.4 - 10.4 fL 10.9 (H) 10.6 (H)   Results for ELIZABETH FORREST (MRN [id number]) as of 1/5/2018 11:28   Ref. Range 1/25/2017 08:35 4/11/2017 08:27 7/6/2017 08:00   Cholesterol,Tot Latest Ref Range: 120 - 200 mg/dL 139 125    Triglycerides Latest Ref Range: 0 - 150 mg/dL 144 136    HDL Latest Ref Range: 40.0 - 60.0 mg/dL 53.0 40.0    Non HDL Cholesterol Latest Ref Range: 30 - 160  86 85    LDL Latest Ref Range: <100 mg/dL 57 58    Chol-Hdl Ratio Unknown 2.62 3.13    Prostatic Specific Antigen Tot Latest Ref Range: 0.00 - 4.00 ng/mL 4.39 (H)     Vitamin D 25OH Total Latest Ref Range: 30 - 100 ng/mL  35 32   TSH Latest Ref Range: 0.36 - 3.74 uIU/mL  1.51 1.71     Assessment and Plan.   82 y.o. male has hemorrhagic  CVA and CAD; Long discussion about ASA or Plavix use; and he decided on continue Plavix; c/o carotid noise occ while sleep; Will get BP and carotid duplex    1. Coronary artery disease involving native coronary artery of native heart without angina pectoris  asx    2. Stented coronary artery  Self disolving    3. Old MI (myocardial infarction)  asx and stable    4. Intracranial Hemorrhage-Right sided weakness w/ residual tingling right th,index,middle fingers  discussed    5. Mixed dyslipidemia  Recheck    6. ERUM (obstructive sleep apnea): prob not on 1/2016 OPO  No treatment; refused sleep study    7. Essential hypertension  controlled      Return to clinic in  3 , months    1. Coronary artery disease involving native coronary artery of native heart without angina pectoris     2. Stented coronary artery     3. Old MI (myocardial infarction)     4. Intracranial Hemorrhage-Right sided weakness w/ residual tingling right th,index,middle fingers     5. Mixed dyslipidemia     6. ERUM (obstructive sleep apnea)     7. Essential hypertension           Sasha Lopez D.O.  [address] [facility] [address]  VIA In Basket

## 2018-02-01 ENCOUNTER — TELEPHONE (OUTPATIENT)
Dept: CARDIOLOGY | Facility: MEDICAL CENTER | Age: 83
End: 2018-02-01

## 2018-04-19 ENCOUNTER — OFFICE VISIT (OUTPATIENT)
Dept: CARDIOLOGY | Facility: CLINIC | Age: 83
End: 2018-04-19
Payer: MEDICARE

## 2018-04-19 VITALS
OXYGEN SATURATION: 95 % | BODY MASS INDEX: 29.62 KG/M2 | HEART RATE: 65 BPM | HEIGHT: 69 IN | SYSTOLIC BLOOD PRESSURE: 120 MMHG | WEIGHT: 200 LBS | DIASTOLIC BLOOD PRESSURE: 60 MMHG

## 2018-04-19 DIAGNOSIS — K27.9 PUD (PEPTIC ULCER DISEASE): Chronic | ICD-10-CM

## 2018-04-19 DIAGNOSIS — E78.2 MIXED DYSLIPIDEMIA: ICD-10-CM

## 2018-04-19 DIAGNOSIS — I25.10 CORONARY ARTERY DISEASE DUE TO LIPID RICH PLAQUE: Chronic | ICD-10-CM

## 2018-04-19 DIAGNOSIS — I10 BENIGN ESSENTIAL HTN: ICD-10-CM

## 2018-04-19 DIAGNOSIS — I62.9 INTRACRANIAL HEMORRHAGE (HCC): ICD-10-CM

## 2018-04-19 DIAGNOSIS — R93.1 ECHOCARDIOGRAM ABNORMAL: Chronic | ICD-10-CM

## 2018-04-19 DIAGNOSIS — Z95.5 STENTED CORONARY ARTERY: Chronic | ICD-10-CM

## 2018-04-19 DIAGNOSIS — I25.10 CORONARY ARTERY DISEASE INVOLVING NATIVE CORONARY ARTERY OF NATIVE HEART WITHOUT ANGINA PECTORIS: ICD-10-CM

## 2018-04-19 DIAGNOSIS — I63.9 CEREBROVASCULAR ACCIDENT (CVA), UNSPECIFIED MECHANISM (HCC): ICD-10-CM

## 2018-04-19 DIAGNOSIS — I10 ESSENTIAL HYPERTENSION: ICD-10-CM

## 2018-04-19 DIAGNOSIS — I25.83 CORONARY ARTERY DISEASE DUE TO LIPID RICH PLAQUE: Chronic | ICD-10-CM

## 2018-04-19 PROCEDURE — 99214 OFFICE O/P EST MOD 30 MIN: CPT | Performed by: INTERNAL MEDICINE

## 2018-04-19 RX ORDER — CLOPIDOGREL BISULFATE 75 MG/1
75 TABLET ORAL DAILY
Qty: 90 TAB | Refills: 3 | Status: SHIPPED | OUTPATIENT
Start: 2018-04-19 | End: 2019-01-17 | Stop reason: SDUPTHER

## 2018-04-19 RX ORDER — AMLODIPINE BESYLATE 5 MG/1
5 TABLET ORAL DAILY
Qty: 180 TAB | Refills: 3 | Status: SHIPPED | OUTPATIENT
Start: 2018-04-19 | End: 2018-05-21 | Stop reason: SDUPTHER

## 2018-04-19 RX ORDER — ATORVASTATIN CALCIUM 40 MG/1
40 TABLET, FILM COATED ORAL
Qty: 90 TAB | Refills: 3 | Status: SHIPPED | OUTPATIENT
Start: 2018-04-19 | End: 2019-01-17 | Stop reason: SDUPTHER

## 2018-04-19 RX ORDER — LOSARTAN POTASSIUM 25 MG/1
25 TABLET ORAL
Qty: 90 TAB | Refills: 3 | Status: SHIPPED | OUTPATIENT
Start: 2018-04-19 | End: 2019-01-17 | Stop reason: SDUPTHER

## 2018-04-19 ASSESSMENT — ENCOUNTER SYMPTOMS
ABDOMINAL PAIN: 0
FOCAL WEAKNESS: 0
NAUSEA: 0
WEAKNESS: 0
SORE THROAT: 0
FEVER: 0
CHILLS: 0
PND: 0
BRUISES/BLEEDS EASILY: 0
FALLS: 0
PALPITATIONS: 0
COUGH: 0
DIZZINESS: 0
SHORTNESS OF BREATH: 0
CLAUDICATION: 0
BLURRED VISION: 0

## 2018-04-19 NOTE — PROGRESS NOTES
Chief Complaint   Patient presents with   • Coronary Artery Disease       Subjective:   LuisF Ruiz is a 83 y.o. male who presents today for follow-up of his history of intracranial hemorrhage causing stroke in 2009 with subsequent need for cardiac stent in the setting of a non-STEMI in [place] in 2016, this was complicated by peptic ulcer disease and upper GI bleed 3 months after being on dual antiplatelet therapy    He has been doing well for his heart and vascular health he is tolerating his current medications well    Past Medical History:   Diagnosis Date   • Basal ganglia disorder     right side weakness   • BPH (benign prostatic hypertrophy) 2010    stable   • Coronary artery disease due to lipid rich plaque - post PCI 2016    • CVA (cerebrovascular accident) (CMS-HCC) 2010   • Depression 3/23/2016   • Echocardiogram abnormal - moderate asymmetric hypertrophy and mild MR EF normal    • ED (erectile dysfunction)    • Elevated PSA 2011    followed by Dr. Lu   • GERD (gastroesophageal reflux disease)    • History of colonoscopy with polypectomy 10/5/15    polyps; nithin 2018   • Hyperlipidemia    • Hypertension    • Intracranial hemorrhage (CMS-HCC) 12/11/2009   • PUD (peptic ulcer disease)    • Stented coronary artery - 2016 in setting of MI in [place]      Past Surgical History:   Procedure Laterality Date   • GASTROSCOPY-ENDO N/A 3/9/2016    Procedure: GASTROSCOPY-ENDO with Anesthesia ;  Surgeon: David Thompson M.D.;  Location: SURGERY [facility];  Service:    • EYE SURGERY      cataracts   • STENT PLACEMENT N/A 12/28/17    [facility] in [place], [place]     Family History   Problem Relation Age of Onset   • Heart Disease Father    • Heart Disease Sister    • Cancer Sister      stomach   • Cancer Brother      kidney   • Heart Disease Brother      Social History     Social History   • Marital status:      Spouse name: N/A   • Number of children: N/A   • Years of  education: N/A     Occupational History   • Not on file.     Social History Main Topics   • Smoking status: Former Smoker     Quit date: 3/9/1961   • Smokeless tobacco: Never Used   • Alcohol use No   • Drug use: No   • Sexual activity: Yes     Partners: Female      Comment: ; one son (59 yo in 8/'17) lives in [place], [place]     Other Topics Concern   • Not on file     Social History Narrative   • No narrative on file     No Known Allergies  Outpatient Encounter Prescriptions as of 4/19/2018   Medication Sig Dispense Refill   • losartan (COZAAR) 25 MG Tab Take 1 Tab by mouth every day. 90 Tab 3   • amLODIPine (NORVASC) 5 MG Tab Take 1 Tab by mouth every day. 180 Tab 3   • metoprolol (LOPRESSOR) 25 MG Tab Take 0.5 Tabs by mouth 2 times a day. 90 Tab 3   • clopidogrel (PLAVIX) 75 MG Tab Take 1 Tab by mouth every day. 90 Tab 3   • atorvastatin (LIPITOR) 40 MG Tab Take 1 Tab by mouth every day. 90 Tab 3   • sertraline (ZOLOFT) 25 MG tablet TAKE 1 TABLET BY MOUTH  EVERY DAY 90 Tab 0   • CIALIS 10 MG tablet Take 1 tablet by mouth as  needed for Erectile  Dysfunction 18 Tab 0   • vitamin D (CHOLECALCIFEROL) 1000 UNIT Tab Take 1,000 Units by mouth every day.     • Lutein 20 MG Tab Take  by mouth.     • pantoprazole (PROTONIX) 40 MG Tablet Delayed Response Take 1 Tab by mouth every day. 30 Tab 11   • [DISCONTINUED] losartan (COZAAR) 25 MG Tab TAKE 1 TABLET BY MOUTH  EVERY DAY 90 Tab 0   • [DISCONTINUED] amLODIPine (NORVASC) 5 MG Tab TAKE 1 TABLET BY MOUTH  TWICE A  Tab 0   • [DISCONTINUED] metoprolol (LOPRESSOR) 25 MG Tab TAKE ONE-HALF TABLET BY  MOUTH TWO TIMES A DAY 90 Tab 0   • [DISCONTINUED] clopidogrel (PLAVIX) 75 MG Tab Take 1 Tab by mouth every day. 90 Tab 3   • [DISCONTINUED] atorvastatin (LIPITOR) 40 MG Tab TAKE 1 TABLET BY MOUTH  EVERY DAY 90 Tab 3   • NITROSTAT 0.4 MG SL Tab Place 1 Tab under tongue as needed for Chest Pain. 1 tab every 5 minutes for CP, for no more than 3 doses. After three doses,  "call 911. 45 Tab 0     No facility-administered encounter medications on file as of 4/19/2018.      Review of Systems   Constitutional: Negative for chills and fever.   HENT: Negative for sore throat.    Eyes: Negative for blurred vision.   Respiratory: Negative for cough and shortness of breath.    Cardiovascular: Negative for chest pain, palpitations, claudication, leg swelling and PND.   Gastrointestinal: Negative for abdominal pain and nausea.   Musculoskeletal: Negative for falls and joint pain.   Skin: Negative for rash.   Neurological: Negative for dizziness, focal weakness and weakness.   Endo/Heme/Allergies: Does not bruise/bleed easily.        Objective:   /60   Pulse 65   Ht 1.753 m (5' 9\")   Wt 90.7 kg (200 lb)   SpO2 95%   BMI 29.53 kg/m²     Physical Exam   Constitutional: No distress.   HENT:   Mouth/Throat: Oropharynx is clear and moist. No oropharyngeal exudate.   Eyes: No scleral icterus.   Neck: No JVD present.   Cardiovascular: Normal rate and normal heart sounds.  Exam reveals no gallop and no friction rub.    No murmur heard.  Pulmonary/Chest: No respiratory distress. He has no wheezes. He has no rales.   Abdominal: Soft. Bowel sounds are normal.   Musculoskeletal: He exhibits no edema.   Neurological: He is alert.   Skin: No rash noted. He is not diaphoretic.   Psychiatric: He has a normal mood and affect.       Assessment:     1. Intracranial Hemorrhage-Right sided weakness w/ residual tingling right th,index,middle fingers     2. Benign essential HTN  amLODIPine (NORVASC) 5 MG Tab   3. Cerebrovascular accident (CVA), unspecified mechanism (CMS-HCC)     4. Echocardiogram abnormal - moderate asymmetric hypertrophy and mild MR EF normal     5. Coronary artery disease due to lipid rich plaque - post PCI 2016     6. Mixed dyslipidemia     7. Stented coronary artery - 2016 in setting of MI in [place]     8. PUD (peptic ulcer disease)     9. Essential hypertension  losartan (COZAAR) 25 MG Tab "   10. Coronary artery disease involving native coronary artery of native heart without angina pectoris  metoprolol (LOPRESSOR) 25 MG Tab    atorvastatin (LIPITOR) 40 MG Tab       Medical Decision Making:  Today's Assessment / Status / Plan:     It was my pleasure to meet with Mr. Ruiz.    Is accompanied by his wife    Blood pressure is well controlled    Cholesterol is well controlled    We spent significant time discussing the risks and benefits of antiplatelet therapy, given the higher risk of recurrent MI, ischemic stroke, risk of aspirin with his prior history of significant GI bleed on aspirin and Plavix. Agreed that Plavix is likely the better choice she does understand this is a high risk though than aspirin for recurrent intracranial hemorrhage.    I will see Mr. Ruiz back in 1 year time and encouraged him to follow up with us over the phone or e-mail using my MyChart as issues arise.    It is my pleasure to participate in the care of Mr. Ruiz.  Please do not hesitate to contact me with questions or concerns.    Yaw Kat MD PhD FACC  Cardiologist [facility]

## 2018-04-26 DIAGNOSIS — I25.10 CORONARY ARTERY DISEASE INVOLVING NATIVE CORONARY ARTERY OF NATIVE HEART WITHOUT ANGINA PECTORIS: ICD-10-CM

## 2018-04-27 ENCOUNTER — TELEPHONE (OUTPATIENT)
Dept: CARDIOLOGY | Facility: MEDICAL CENTER | Age: 83
End: 2018-04-27

## 2018-04-27 DIAGNOSIS — I10 BENIGN ESSENTIAL HTN: ICD-10-CM

## 2018-04-27 RX ORDER — METOPROLOL SUCCINATE 25 MG/1
25 TABLET, EXTENDED RELEASE ORAL DAILY
Qty: 90 TAB | Refills: 3 | Status: SHIPPED | OUTPATIENT
Start: 2018-04-27 | End: 2019-01-17 | Stop reason: SDUPTHER

## 2018-04-27 NOTE — TELEPHONE ENCOUNTER
----- Message from Yaw Kat M.D. sent at 4/27/2018 10:40 AM PDT -----  Regarding: RE: Problem with prescription  Sure    ----- Message -----  From: Carlita Petersen R.N.  Sent: 4/27/2018   9:43 AM  To: Yaw Kat M.D.  Subject: FW: Problem with prescription                    Can he try long acting Toprol 25mg/d?    ----- Message -----  From: Noemi Dias, Med Ass't  Sent: 4/26/2018   4:08 PM  To: Carlita Petersen R.N.  Subject: FW: Problem with prescription                    metoprolol does not come as 12.5mg tablets, not sure how CW want's to approach this  Please review  Thank you        ----- Message -----  From: Isadora Salcedo  Sent: 4/26/2018   2:37 PM  To: Noemi Dias, Med Ass't  Subject: Problem with prescription                        Noemi,    This is a patient of Dr Kat's. He has been taking 1/2 tab in morning and 1/2 tab in afternoon of Metoprolol 25 mg. The pills are too small to cut in half. Patient wants to get larger pills and wants the prescription to go to [facility] in [place] instead of [facility] mail order.

## 2019-03-13 ENCOUNTER — TELEPHONE (OUTPATIENT)
Dept: CARDIOLOGY | Facility: MEDICAL CENTER | Age: 84
End: 2019-03-13

## 2019-03-13 DIAGNOSIS — I10 BENIGN ESSENTIAL HTN: ICD-10-CM

## 2019-03-13 DIAGNOSIS — E78.2 MIXED DYSLIPIDEMIA: ICD-10-CM

## 2019-03-13 DIAGNOSIS — I25.83 CORONARY ARTERY DISEASE DUE TO LIPID RICH PLAQUE: Chronic | ICD-10-CM

## 2019-03-13 DIAGNOSIS — I25.10 CORONARY ARTERY DISEASE DUE TO LIPID RICH PLAQUE: Chronic | ICD-10-CM

## 2019-03-13 NOTE — TELEPHONE ENCOUNTER
Losartan   Received: Today Message Contents   Sunitabernarda Navarro, Med Ass't  Brigitte Zee R.N.   Phone Number: [phone]          CW     Pt's wife Chanell states he needs an alternative for Losartan due to the recall. She also wants to know if he should continue medication.   Ph#: [phone]      Attempted to call Chanell, unable to reach.  Left voicemail to contact his preferred pharmacy to verify if his medication is affected by the recall and to return this call if she has any questions or concerns.

## 2019-03-14 NOTE — TELEPHONE ENCOUNTER
returning your call   Received: Yesterday Message Contents   DAVIAN Dasilva/Brigitte     Patient is returning your call. She said she's sorry she missed your call, her  had the phone set up to the fax and she didn't hear it. She can be reached at [phone].      Returned Chanell call and reviewed concerns.   Reassurance given to Chanell that concerns will be relayed to MD for advise.  She is requesting for lab to be ordered for patient's annual exam in May.  Reassurance given that lab ordered will be printed and mailed to pt mailing address.  She states no other concerns or questions a this time and is appreciative of information given.    Lab ordered, printed, and mailed to mailing address.

## 2019-03-15 RX ORDER — LISINOPRIL 5 MG/1
5 TABLET ORAL DAILY
Qty: 90 TAB | Refills: 3 | Status: SHIPPED | OUTPATIENT
Start: 2019-03-15 | End: 2019-09-26

## 2019-03-15 NOTE — TELEPHONE ENCOUNTER
Returned phone call to patient wife, Chanell and reviewed MD recommendations.  She verbalizes understanding and pt pharmacy verified - [facility].      Medication list updated.  Lisinopril ordered and sent to preferred pharmacy.

## 2019-06-19 ENCOUNTER — TELEPHONE (OUTPATIENT)
Dept: CARDIOLOGY | Facility: MEDICAL CENTER | Age: 84
End: 2019-06-19

## 2019-09-26 ENCOUNTER — OFFICE VISIT (OUTPATIENT)
Dept: CARDIOLOGY | Facility: CLINIC | Age: 84
End: 2019-09-26
Payer: MEDICARE

## 2019-09-26 VITALS
SYSTOLIC BLOOD PRESSURE: 122 MMHG | OXYGEN SATURATION: 93 % | WEIGHT: 201 LBS | BODY MASS INDEX: 31.55 KG/M2 | HEIGHT: 67 IN | DIASTOLIC BLOOD PRESSURE: 60 MMHG | HEART RATE: 60 BPM

## 2019-09-26 DIAGNOSIS — I25.10 CORONARY ARTERY DISEASE DUE TO LIPID RICH PLAQUE: Chronic | ICD-10-CM

## 2019-09-26 DIAGNOSIS — Z95.5 STENTED CORONARY ARTERY: Chronic | ICD-10-CM

## 2019-09-26 DIAGNOSIS — R93.1 ECHOCARDIOGRAM ABNORMAL: Chronic | ICD-10-CM

## 2019-09-26 DIAGNOSIS — E78.2 MIXED DYSLIPIDEMIA: ICD-10-CM

## 2019-09-26 DIAGNOSIS — I25.83 CORONARY ARTERY DISEASE DUE TO LIPID RICH PLAQUE: Chronic | ICD-10-CM

## 2019-09-26 DIAGNOSIS — I10 ESSENTIAL HYPERTENSION: ICD-10-CM

## 2019-09-26 PROCEDURE — 99214 OFFICE O/P EST MOD 30 MIN: CPT | Performed by: INTERNAL MEDICINE

## 2019-09-26 RX ORDER — LOSARTAN POTASSIUM 25 MG/1
25 TABLET ORAL DAILY
COMMUNITY
Start: 2019-08-05 | End: 2019-09-26 | Stop reason: SDUPTHER

## 2019-09-26 RX ORDER — NITROGLYCERIN 0.4 MG/1
0.4 TABLET SUBLINGUAL PRN
Qty: 25 TAB | Refills: 11 | Status: SHIPPED | OUTPATIENT
Start: 2019-09-26 | End: 2022-11-15 | Stop reason: SDUPTHER

## 2019-09-26 RX ORDER — LOSARTAN POTASSIUM 25 MG/1
25 TABLET ORAL DAILY
Qty: 90 TAB | Refills: 3 | Status: SHIPPED | OUTPATIENT
Start: 2019-09-26 | End: 2020-10-09 | Stop reason: SDUPTHER

## 2019-09-26 RX ORDER — ASPIRIN 81 MG/1
81 TABLET, CHEWABLE ORAL DAILY
Qty: 100 TAB | COMMUNITY
Start: 2019-09-26

## 2019-09-27 ASSESSMENT — ENCOUNTER SYMPTOMS
PALPITATIONS: 0
FOCAL WEAKNESS: 0
SHORTNESS OF BREATH: 0
FEVER: 0
CHILLS: 0
BRUISES/BLEEDS EASILY: 0
CLAUDICATION: 0
SORE THROAT: 0
PND: 0
FALLS: 0
WEAKNESS: 0
NAUSEA: 0
BLURRED VISION: 0
DIZZINESS: 0
ABDOMINAL PAIN: 0
COUGH: 0

## 2019-09-28 NOTE — PROGRESS NOTES
Chief Complaint   Patient presents with   • Coronary Artery Disease       Subjective:   Luis F Ruiz is a 84 y.o. male who presents today for follow-up of his history of stenting as well as intracranial hemorrhage and GI bleed    He is been doing okay on his medications are concerned about the losartan recall    Past Medical History:   Diagnosis Date   • Basal ganglia disorder     right side weakness   • BPH (benign prostatic hypertrophy) 2010    stable   • Coronary artery disease due to lipid rich plaque - post PCI 2016    • CVA (cerebrovascular accident) ([facility]) 2010   • Depression 3/23/2016   • Echocardiogram abnormal - moderate asymmetric hypertrophy and mild MR EF normal    • ED (erectile dysfunction)    • Elevated PSA 2011    followed by Dr. Lu   • GERD (gastroesophageal reflux disease)    • History of colonoscopy with polypectomy 10/5/15    polyps; nithin 2018   • Hyperlipidemia    • Hypertension    • Intracranial hemorrhage (HCC) 12/11/2009   • PUD (peptic ulcer disease)    • Stented coronary artery - 2016 in setting of MI in [place]      Past Surgical History:   Procedure Laterality Date   • GASTROSCOPY-ENDO N/A 3/9/2016    Procedure: GASTROSCOPY-ENDO with Anesthesia ;  Surgeon: David Thompson M.D.;  Location: SURGERY [facility];  Service:    • EYE SURGERY      cataracts   • STENT PLACEMENT N/A 12/28/17    [facility] in [place], [place]     Family History   Problem Relation Age of Onset   • Heart Disease Father    • Heart Disease Sister    • Cancer Sister         stomach   • Cancer Brother         kidney   • Heart Disease Brother      Social History     Socioeconomic History   • Marital status:      Spouse name: Not on file   • Number of children: Not on file   • Years of education: Not on file   • Highest education level: Not on file   Occupational History   • Not on file   Social Needs   • Financial resource strain: Not on file   • Food insecurity:     Worry: Not on file      Inability: Not on file   • Transportation needs:     Medical: Not on file     Non-medical: Not on file   Tobacco Use   • Smoking status: Former Smoker     Last attempt to quit: 3/9/1961     Years since quittin.5   • Smokeless tobacco: Never Used   Substance and Sexual Activity   • Alcohol use: No   • Drug use: No   • Sexual activity: Yes     Partners: Female     Comment: ; one son (61 yo in ) lives in [place], [place]   Lifestyle   • Physical activity:     Days per week: Not on file     Minutes per session: Not on file   • Stress: Not on file   Relationships   • Social connections:     Talks on phone: Not on file     Gets together: Not on file     Attends Anabaptist service: Not on file     Active member of club or organization: Not on file     Attends meetings of clubs or organizations: Not on file     Relationship status: Not on file   • Intimate partner violence:     Fear of current or ex partner: Not on file     Emotionally abused: Not on file     Physically abused: Not on file     Forced sexual activity: Not on file   Other Topics Concern   • Not on file   Social History Narrative   • Not on file     Allergies   Allergen Reactions   • Lisinopril Diarrhea     Stomach problems     Outpatient Encounter Medications as of 2019   Medication Sig Dispense Refill   • aspirin (ASA) 81 MG Chew Tab chewable tablet Take 1 Tab by mouth every day. 100 Tab    • losartan (COZAAR) 25 MG Tab Take 1 Tab by mouth every day. 90 Tab 3   • nitroglycerin (NITROSTAT) 0.4 MG SL Tab Place 1 Tab under tongue as needed for Chest Pain. 25 Tab 11   • amLODIPine (NORVASC) 5 MG Tab Take 1 Tab by mouth 2 Times a Day. 180 Tab 3   • atorvastatin (LIPITOR) 40 MG Tab Take 1 Tab by mouth every day. 90 Tab 3   • metoprolol SR (TOPROL XL) 25 MG TABLET SR 24 HR Take 1 Tab by mouth every day. 90 Tab 3   • sertraline (ZOLOFT) 25 MG tablet Take 1 Tab by mouth every day. 90 Tab 3   • tadalafil (CIALIS) 10 MG tablet Take 1 Tab by mouth  "as needed. 18 Tab 0   • vitamin D (CHOLECALCIFEROL) 1000 UNIT Tab Take 1,000 Units by mouth every day.     • Lutein 20 MG Tab Take  by mouth.     • [DISCONTINUED] losartan (COZAAR) 25 MG Tab 25 mg every day.     • [DISCONTINUED] lisinopril (PRINIVIL) 5 MG Tab Take 1 Tab by mouth every day. 90 Tab 3   • [DISCONTINUED] clopidogrel (PLAVIX) 75 MG Tab Take 1 Tab by mouth every day. 90 Tab 3   • [DISCONTINUED] NITROSTAT 0.4 MG SL Tab DISSOLVE 1 TABLET UNDER THE TONGUE EVERY 5 MINUTES AS  NEEDED FOR CHEST PAIN UP TO 3 DOSES, AFTER 3 DOSES CALL 911 50 Tab 3     No facility-administered encounter medications on file as of 9/26/2019.      Review of Systems   Constitutional: Negative for chills and fever.   HENT: Negative for sore throat.    Eyes: Negative for blurred vision.   Respiratory: Negative for cough and shortness of breath.    Cardiovascular: Negative for chest pain, palpitations, claudication, leg swelling and PND.   Gastrointestinal: Negative for abdominal pain and nausea.   Musculoskeletal: Negative for falls and joint pain.   Skin: Negative for rash.   Neurological: Negative for dizziness, focal weakness and weakness.   Endo/Heme/Allergies: Does not bruise/bleed easily.        Objective:   /60 (BP Location: Right arm, Patient Position: Sitting)   Pulse 60   Ht 1.702 m (5' 7\")   Wt 91.2 kg (201 lb)   SpO2 93%   BMI 31.48 kg/m²     Physical Exam   Constitutional: No distress.   HENT:   Mouth/Throat: Oropharynx is clear and moist. No oropharyngeal exudate.   Eyes: No scleral icterus.   Neck: No JVD present.   Cardiovascular: Normal rate and normal heart sounds. Exam reveals no gallop and no friction rub.   No murmur heard.  Pulmonary/Chest: No respiratory distress. He has no wheezes. He has no rales.   Abdominal: Soft. Bowel sounds are normal.   Musculoskeletal: He exhibits no edema.   Neurological: He is alert.   Skin: No rash noted. He is not diaphoretic.   Psychiatric: He has a normal mood and affect. "       Assessment:     1. Coronary artery disease due to lipid rich plaque - post PCI 2016     2. Echocardiogram abnormal - moderate asymmetric hypertrophy and mild MR EF normal     3. Mixed dyslipidemia  nitroglycerin (NITROSTAT) 0.4 MG SL Tab   4. Stented coronary artery - 12/2015 TAWNY to OM-1 in setting of MI in [place]     5. Essential hypertension  nitroglycerin (NITROSTAT) 0.4 MG SL Tab       Medical Decision Making:  Today's Assessment / Status / Plan:     It was my pleasure to meet with Mr. Ruiz.    Blood pressure is well controlled.      We decided to switch back to aspirin from Plavix given the bleeding concerns in the past    I will see Mr. Ruiz back in 1 year time and encouraged him to follow up with us over the phone or electronically using my [employer] as issues arise.    It is my pleasure to participate in the care of Mr. Ruiz.  Please do not hesitate to contact me with questions or concerns.    Yaw Kat MD PhD FACC  Cardiologist [facility]    Please note that this dictation was created using voice recognition software. I have worked with consultants from the vendor as well as technical experts from [facility] to optimize the interface. I have made every reasonable attempt to correct obvious errors, but I expect that there are errors of grammar and possibly content I did not discover before finalizing the note.

## 2020-06-18 ENCOUNTER — TELEPHONE (OUTPATIENT)
Dept: CARDIOLOGY | Facility: MEDICAL CENTER | Age: 85
End: 2020-06-18

## 2020-10-09 ENCOUNTER — TELEPHONE (OUTPATIENT)
Dept: CARDIOLOGY | Facility: MEDICAL CENTER | Age: 85
End: 2020-10-09

## 2020-10-09 DIAGNOSIS — I10 ESSENTIAL HYPERTENSION: ICD-10-CM

## 2020-10-09 DIAGNOSIS — I10 BENIGN ESSENTIAL HTN: ICD-10-CM

## 2020-10-09 RX ORDER — LOSARTAN POTASSIUM 25 MG/1
25 TABLET ORAL DAILY
Qty: 90 TAB | Refills: 0 | Status: SHIPPED | OUTPATIENT
Start: 2020-10-09 | End: 2020-10-27 | Stop reason: SDUPTHER

## 2020-10-09 RX ORDER — LOSARTAN POTASSIUM 25 MG/1
25 TABLET ORAL DAILY
Qty: 90 TAB | Refills: 1 | Status: CANCELLED | OUTPATIENT
Start: 2020-10-09

## 2020-10-09 NOTE — TELEPHONE ENCOUNTER
Received phone call from GIANNA KLEIN regarding medication refill for Losartan as he was having trouble prepping medication.  Losartan successfully refilled and sent to preferred pharmacy.      Task deferred to MA to schedule pt for FV as last OV was 9/2019

## 2020-10-27 ENCOUNTER — OFFICE VISIT (OUTPATIENT)
Dept: CARDIOLOGY | Facility: CLINIC | Age: 85
End: 2020-10-27
Payer: MEDICARE

## 2020-10-27 VITALS
SYSTOLIC BLOOD PRESSURE: 120 MMHG | OXYGEN SATURATION: 97 % | HEART RATE: 60 BPM | WEIGHT: 201 LBS | DIASTOLIC BLOOD PRESSURE: 60 MMHG | BODY MASS INDEX: 29.77 KG/M2 | HEIGHT: 69 IN

## 2020-10-27 DIAGNOSIS — Z95.5 STENTED CORONARY ARTERY: Chronic | ICD-10-CM

## 2020-10-27 DIAGNOSIS — I10 ESSENTIAL HYPERTENSION: ICD-10-CM

## 2020-10-27 DIAGNOSIS — E78.2 MIXED DYSLIPIDEMIA: ICD-10-CM

## 2020-10-27 DIAGNOSIS — N52.9 ERECTILE DYSFUNCTION, UNSPECIFIED ERECTILE DYSFUNCTION TYPE: ICD-10-CM

## 2020-10-27 DIAGNOSIS — I25.10 CORONARY ARTERY DISEASE DUE TO LIPID RICH PLAQUE: Chronic | ICD-10-CM

## 2020-10-27 DIAGNOSIS — I10 BENIGN ESSENTIAL HTN: ICD-10-CM

## 2020-10-27 DIAGNOSIS — I63.9 CEREBROVASCULAR ACCIDENT (CVA), UNSPECIFIED MECHANISM (HCC): ICD-10-CM

## 2020-10-27 DIAGNOSIS — I25.83 CORONARY ARTERY DISEASE DUE TO LIPID RICH PLAQUE: Chronic | ICD-10-CM

## 2020-10-27 DIAGNOSIS — R93.1 ECHOCARDIOGRAM ABNORMAL: Chronic | ICD-10-CM

## 2020-10-27 PROCEDURE — 99214 OFFICE O/P EST MOD 30 MIN: CPT | Performed by: INTERNAL MEDICINE

## 2020-10-27 RX ORDER — TADALAFIL 10 MG/1
10 TABLET ORAL PRN
Qty: 20 TAB | Refills: 3 | Status: SHIPPED | OUTPATIENT
Start: 2020-10-27 | End: 2021-11-30 | Stop reason: SDUPTHER

## 2020-10-27 RX ORDER — LOSARTAN POTASSIUM 25 MG/1
25 TABLET ORAL DAILY
Qty: 90 TAB | Refills: 3 | Status: SHIPPED | OUTPATIENT
Start: 2020-10-27 | End: 2020-12-22 | Stop reason: SDUPTHER

## 2020-10-27 ASSESSMENT — ENCOUNTER SYMPTOMS
CLAUDICATION: 0
DIZZINESS: 0
FALLS: 0
WEAKNESS: 0
BRUISES/BLEEDS EASILY: 0
BLURRED VISION: 0
NAUSEA: 0
FEVER: 0
PALPITATIONS: 0
COUGH: 0
SORE THROAT: 0
FOCAL WEAKNESS: 0
SHORTNESS OF BREATH: 0
CHILLS: 0
PND: 0
ABDOMINAL PAIN: 0

## 2020-10-27 ASSESSMENT — FIBROSIS 4 INDEX: FIB4 SCORE: 1.62

## 2020-10-27 NOTE — PROGRESS NOTES
Chief Complaint   Patient presents with   • Coronary Artery Disease       Subjective:   Luis F Ruiz is a 85 y.o. male who presents today for follow-up of his history of stenting but also significant bleeding in the past    He has been doing over the last year no major health concerns tolerating his medications well    Past Medical History:   Diagnosis Date   • Basal ganglia disorder     right side weakness   • BPH (benign prostatic hypertrophy) 2010    stable   • Coronary artery disease due to lipid rich plaque - post PCI 2016    • CVA (cerebrovascular accident) ([facility]) 2010   • Depression 3/23/2016   • Echocardiogram abnormal - moderate asymmetric hypertrophy and mild MR EF normal    • ED (erectile dysfunction)    • Elevated PSA 2011    followed by Dr. Lu   • GERD (gastroesophageal reflux disease)    • History of colonoscopy with polypectomy 10/5/15    polyps; nithin 2018   • Hyperlipidemia    • Hypertension    • Intracranial hemorrhage (HCC) 12/11/2009   • PUD (peptic ulcer disease)    • Stented coronary artery - 2016 in setting of MI in [place]      Past Surgical History:   Procedure Laterality Date   • GASTROSCOPY-ENDO N/A 3/9/2016    Procedure: GASTROSCOPY-ENDO with Anesthesia ;  Surgeon: David Thompson M.D.;  Location: SURGERY [facility];  Service:    • EYE SURGERY      cataracts   • STENT PLACEMENT N/A 12/28/17    [facility] in [place], [place]     Family History   Problem Relation Age of Onset   • Heart Disease Father    • Heart Disease Sister    • Cancer Sister         stomach   • Cancer Brother         kidney   • Heart Disease Brother      Social History     Socioeconomic History   • Marital status:      Spouse name: Not on file   • Number of children: Not on file   • Years of education: Not on file   • Highest education level: Not on file   Occupational History   • Not on file   Social Needs   • Financial resource strain: Not on file   • Food insecurity     Worry: Not on  file     Inability: Not on file   • Transportation needs     Medical: Not on file     Non-medical: Not on file   Tobacco Use   • Smoking status: Former Smoker     Packs/day: 0.00     Quit date: 3/9/1961     Years since quittin.6   • Smokeless tobacco: Never Used   Substance and Sexual Activity   • Alcohol use: No   • Drug use: No   • Sexual activity: Yes     Partners: Female     Comment: ; one son (61 yo in ) lives in [place], [place]   Lifestyle   • Physical activity     Days per week: Not on file     Minutes per session: Not on file   • Stress: Not on file   Relationships   • Social connections     Talks on phone: Not on file     Gets together: Not on file     Attends [employer] service: Not on file     Active member of club or organization: Not on file     Attends meetings of clubs or organizations: Not on file     Relationship status: Not on file   • Intimate partner violence     Fear of current or ex partner: Not on file     Emotionally abused: Not on file     Physically abused: Not on file     Forced sexual activity: Not on file   Other Topics Concern   • Not on file   Social History Narrative   • Not on file     Allergies   Allergen Reactions   • Lisinopril Diarrhea     Stomach problems     Outpatient Encounter Medications as of 10/27/2020   Medication Sig Dispense Refill   • losartan (COZAAR) 25 MG Tab Take 1 Tab by mouth every day. MUST BE FOR FURTHER REFILLS, THANK YOU! 90 Tab 0   • amLODIPine (NORVASC) 5 MG Tab Take 1 Tab by mouth 2 Times a Day. 180 Tab 3   • sertraline (ZOLOFT) 25 MG tablet Take 1 Tab by mouth every day. 90 Tab 3   • metoprolol SR (TOPROL XL) 25 MG TABLET SR 24 HR Take 1 Tab by mouth every day. 90 Tab 3   • atorvastatin (LIPITOR) 40 MG Tab Take 1 Tab by mouth every day. 90 Tab 3   • aspirin (ASA) 81 MG Chew Tab chewable tablet Take 1 Tab by mouth every day. 100 Tab    • nitroglycerin (NITROSTAT) 0.4 MG SL Tab Place 1 Tab under tongue as needed for Chest Pain. 25 Tab 11   •  "tadalafil (CIALIS) 10 MG tablet Take 1 Tab by mouth as needed. 18 Tab 0   • vitamin D (CHOLECALCIFEROL) 1000 UNIT Tab Take 1,000 Units by mouth every day.     • Lutein 20 MG Tab Take  by mouth.       No facility-administered encounter medications on file as of 10/27/2020.      Review of Systems   Constitutional: Negative for chills and fever.   HENT: Negative for sore throat.    Eyes: Negative for blurred vision.   Respiratory: Negative for cough and shortness of breath.    Cardiovascular: Negative for chest pain, palpitations, claudication, leg swelling and PND.   Gastrointestinal: Negative for abdominal pain and nausea.   Musculoskeletal: Negative for falls and joint pain.   Skin: Negative for rash.   Neurological: Negative for dizziness, focal weakness and weakness.   Endo/Heme/Allergies: Does not bruise/bleed easily.        Objective:   /60 (BP Location: Right arm, Patient Position: Sitting)   Pulse 60   Ht 1.753 m (5' 9\")   Wt 91.2 kg (201 lb)   SpO2 97%   BMI 29.68 kg/m²     Physical Exam   Constitutional: No distress.   HENT:   Patient wearing a mask due to COVID precautions   Eyes: No scleral icterus.   Neck: No JVD present.   Cardiovascular: Normal rate and normal heart sounds. Exam reveals no gallop and no friction rub.   No murmur heard.  Pulmonary/Chest: No respiratory distress. He has no wheezes. He has no rales.   Abdominal: Soft. Bowel sounds are normal.   Musculoskeletal:         General: No edema.   Neurological: He is alert.   Skin: No rash noted. He is not diaphoretic.   Psychiatric: He has a normal mood and affect.     We reviewed in person the most recent labs  Recent Results (from the past 4032 hour(s))   CBC WITHOUT DIFFERENTIAL    Collection Time: 06/19/20  9:05 AM   Result Value Ref Range    WBC 7.7 4.8 - 10.8 K/uL    RBC 4.45 (L) 4.70 - 6.10 M/uL    Hemoglobin 13.9 (L) 14.0 - 18.0 g/dL    Hematocrit 40.4 (L) 42.0 - 52.0 %    MCV 90.8 80.0 - 94.0 fL    MCH 31.2 (H) 27.0 - 31.0 pg "    MCHC 34.4 33.0 - 37.0 g/dL    RDW 13.1 11.5 - 14.5 %    Platelet Count 212 130 - 400 K/uL    MPV 10.8 (H) 7.4 - 10.4 fL   Comp Metabolic Panel    Collection Time: 06/19/20  9:05 AM   Result Value Ref Range    Sodium 139 136 - 145 mmol/L    Potassium 3.9 3.5 - 5.1 mmol/L    Chloride 106 98 - 107 mmol/L    Co2 25 21 - 32 mmol/L    Anion Gap 12 10 - 18 mmol/L    Glucose 103 (H) 74 - 99 mg/dL    Bun 22 (H) 7 - 18 mg/dL    Creatinine 1.0 0.8 - 1.3 mg/dL    Calcium 8.6 8.5 - 11.0 mg/dL    AST(SGOT) 20 15 - 37 U/L    ALT(SGPT) 24 12 - 78 U/L    Alkaline Phosphatase 80 46 - 116 U/L    Total Bilirubin 0.8 0.2 - 1.0 mg/dL    Albumin 3.7 3.4 - 5.0 g/dL    Total Protein 7.3 6.4 - 8.2 g/dL    A-G Ratio 1.0    Lipid Profile    Collection Time: 06/19/20  9:05 AM   Result Value Ref Range    Cholesterol,Tot 108 (L) 120 - 200 mg/dL    Triglycerides 144 0 - 150 mg/dL    LDL 37 <100 mg/dL    HDL 42.0 40.0 - 60.0 mg/dL    Chol-Hdl Ratio 2.57     Non HDL Cholesterol 66 30 - 160   TSH    Collection Time: 06/19/20  9:05 AM   Result Value Ref Range    TSH 1.32 0.36 - 3.74 uIU/mL   ESTIMATED GFR    Collection Time: 06/19/20  9:05 AM   Result Value Ref Range    GFR If African American >60 >60 mL/min/1.73 m 2    GFR If Non African American >60 >60 mL/min/1.73 m 2   '  Assessment:     1. Benign essential HTN     2. Coronary artery disease due to lipid rich plaque - post PCI 2016     3. Cerebrovascular accident (CVA), unspecified mechanism (HCC)     4. Echocardiogram abnormal - moderate asymmetric hypertrophy and mild MR EF normal     5. Mixed dyslipidemia     6. Stented coronary artery - 12/2015 TAWNY to OM-1 in setting of MI in [place]         Medical Decision Making:  Today's Assessment / Status / Plan:     It was my pleasure to meet with Mr. Ruiz.    Blood pressure is well controlled.  We specifically assessed the labs on hypertension treatment    He is on appropriate statin.    I will see Mr. Ruiz back in 1 year time and encouraged him to  follow up with us over the phone or electronically using my MyChart as issues arise.    It is my pleasure to participate in the care of Mr. Ruiz.  Please do not hesitate to contact me with questions or concerns.    Yaw Kat MD PhD [facility]  Cardiologist [facility]    Please note that this dictation was created using voice recognition software. There may be errors I did not discover before finalizing the note.

## 2021-11-30 DIAGNOSIS — N52.9 ERECTILE DYSFUNCTION, UNSPECIFIED ERECTILE DYSFUNCTION TYPE: ICD-10-CM

## 2021-11-30 RX ORDER — TADALAFIL 10 MG/1
10 TABLET ORAL PRN
Qty: 20 TABLET | Refills: 0 | Status: SHIPPED | OUTPATIENT
Start: 2021-11-30

## 2021-11-30 NOTE — TELEPHONE ENCOUNTER
Refill Request  Apoorva Mistry 4 hours ago (9:37 AM)     Pt was last seen by CW on 10/27/2020.   RTC: Return in about 1 year (around 10/27/2021).   No FV is scheduled.   Thank you!    Routing comment      CLARIFICATION RELAYED TO MD FOR ADVISE.

## 2025-01-28 ENCOUNTER — TELEPHONE (OUTPATIENT)
Dept: CARDIOLOGY | Facility: MEDICAL CENTER | Age: 89
End: 2025-01-28
Payer: MEDICARE

## 2025-01-28 NOTE — TELEPHONE ENCOUNTER
Called and spoke to pt re: scheduling f/v w/ CW. Pt does not wish to drive to [place]. Scheduling number given for [facility], pt will establish w/ a cardiologist there. /cg 01/28/25

## 2025-03-03 DIAGNOSIS — I25.83 CORONARY ARTERY DISEASE DUE TO LIPID RICH PLAQUE: ICD-10-CM

## 2025-03-03 DIAGNOSIS — I25.10 CORONARY ARTERY DISEASE DUE TO LIPID RICH PLAQUE: ICD-10-CM
